# Patient Record
Sex: FEMALE | Race: BLACK OR AFRICAN AMERICAN | NOT HISPANIC OR LATINO | Employment: FULL TIME | URBAN - METROPOLITAN AREA
[De-identification: names, ages, dates, MRNs, and addresses within clinical notes are randomized per-mention and may not be internally consistent; named-entity substitution may affect disease eponyms.]

---

## 2019-06-20 ENCOUNTER — OFFICE VISIT (OUTPATIENT)
Dept: URGENT CARE | Facility: CLINIC | Age: 44
End: 2019-06-20
Payer: COMMERCIAL

## 2019-06-20 VITALS
HEIGHT: 66 IN | WEIGHT: 212 LBS | RESPIRATION RATE: 18 BRPM | DIASTOLIC BLOOD PRESSURE: 81 MMHG | TEMPERATURE: 98 F | OXYGEN SATURATION: 99 % | HEART RATE: 62 BPM | BODY MASS INDEX: 34.07 KG/M2 | SYSTOLIC BLOOD PRESSURE: 123 MMHG

## 2019-06-20 DIAGNOSIS — M25.511 ACUTE PAIN OF RIGHT SHOULDER: Primary | ICD-10-CM

## 2019-06-20 PROCEDURE — 3008F PR BODY MASS INDEX (BMI) DOCUMENTED: ICD-10-PCS | Mod: CPTII,S$GLB,, | Performed by: NURSE PRACTITIONER

## 2019-06-20 PROCEDURE — 96372 PR INJECTION,THERAP/PROPH/DIAG2ST, IM OR SUBCUT: ICD-10-PCS | Mod: S$GLB,,, | Performed by: NURSE PRACTITIONER

## 2019-06-20 PROCEDURE — 99203 OFFICE O/P NEW LOW 30 MIN: CPT | Mod: 25,S$GLB,, | Performed by: NURSE PRACTITIONER

## 2019-06-20 PROCEDURE — 96372 THER/PROPH/DIAG INJ SC/IM: CPT | Mod: S$GLB,,, | Performed by: NURSE PRACTITIONER

## 2019-06-20 PROCEDURE — 99203 PR OFFICE/OUTPT VISIT, NEW, LEVL III, 30-44 MIN: ICD-10-PCS | Mod: 25,S$GLB,, | Performed by: NURSE PRACTITIONER

## 2019-06-20 PROCEDURE — 3008F BODY MASS INDEX DOCD: CPT | Mod: CPTII,S$GLB,, | Performed by: NURSE PRACTITIONER

## 2019-06-20 RX ORDER — KETOROLAC TROMETHAMINE 30 MG/ML
30 INJECTION, SOLUTION INTRAMUSCULAR; INTRAVENOUS
Status: COMPLETED | OUTPATIENT
Start: 2019-06-20 | End: 2019-06-20

## 2019-06-20 RX ORDER — DICLOFENAC SODIUM 10 MG/G
2 GEL TOPICAL 4 TIMES DAILY
Qty: 1 TUBE | Refills: 0 | Status: SHIPPED | OUTPATIENT
Start: 2019-06-20 | End: 2020-09-21 | Stop reason: CLARIF

## 2019-06-20 RX ADMIN — KETOROLAC TROMETHAMINE 30 MG: 30 INJECTION, SOLUTION INTRAMUSCULAR; INTRAVENOUS at 09:06

## 2019-06-20 NOTE — PATIENT INSTRUCTIONS
Patient to take aleve as needed for pain. Patient to alternate heat and ice. Sling given for comfort.     Patient given Orthopedics referral if pain persists or worsens the next 7-10 days.    Educated patient on shoulder exercises that should be performed at home.      Shoulder Pain with Uncertain Cause  Shoulder pain can have many causes. Pain often comes from the structures that surround the shoulder joint. These are the joint capsule, ligaments, tendons, muscles, and bursa. Pain can also come from cartilage in the joint. Cartilage can become worn out or injured. Its important to know whats causing your pain so the healthcare provider can use the correct treatment. But sometimes its difficult to find the exact cause of shoulder pain. You may need to see a specialist (orthopedist). You may also need special tests such as a CT scan or MRI. The provider may need to use special tools to look inside the joint (arthroscopy).  Shoulder pain can be treated with a sling or a device that keeps your shoulder from moving. You can take an anti-inflammatory medicine such as ibuprofen to ease pain. You may need to do special shoulder exercises. Follow up with a specialist if the pain is severe or doesnt go away after a few weeks.  Home care  Follow these tips when caring for yourself at home:  · If a sling was given to you, leave it in place for the time advised by your healthcare provider. If you arent sure how long to wear it, ask for advice. If the sling becomes loose, adjust it so that your forearm is level with the ground. Your shoulder should feel well supported.  · Put an ice pack on the injured area for 20 minutes every 1 to 2 hours the first day. You can make your own ice pack by putting ice cubes in a plastic bag. Wrap the bag in a thin towel. Continue with ice packs 3 to 4 times a day for the next 2 days. Then use the pack as needed to ease pain and swelling.  · You may use acetaminophen or ibuprofen to control  pain, unless another pain medicine was prescribed. If you have chronic liver or kidney disease, talk with your healthcare provider before using these medicines. Also talk with your provider if youve ever had a stomach ulcer or GI bleeding.  · Shoulder pain may seem worse at night, when there is less to distract you from the pain. If you sleep on your side, try to keep weight off your painful shoulder. Propping pillows behind you may stop you from rolling over onto that shoulder during sleep.   · Shoulder and elbow joints can become stiff if left in a sling for too long. You should start range of motion exercises about 7 to 10 days after the injury. Talk with your provider to find out what type of exercises to do and how soon to start.  · You can take the sling off to shower or bathe.  Follow-up care  Follow up with your healthcare provider if you dont start to get better in the next 5 days.  When to seek medical advice  Call your healthcare provider right away if any of these occur:  · Pain or swelling gets worse or continues for more than a few days  · Your hand or fingers become cold, blue, numb, or tingly  · Large amount of bruising on your shoulder or upper arm  · Difficulty moving your hand or fingers  · Weakness in your hand or fingers  · Your shoulder becomes stiff  · It feels like your shoulder is popping out  · You are less able to do your daily activities  Date Last Reviewed: 10/1/2016  © 6504-4670 [employer]. [address] [place] [address]. All rights reserved. This information is not intended as a substitute for professional medical care. Always follow your healthcare professional's instructions.        Exercises for Shoulder Flexibility: Wall Walk    Improving your flexibility can reduce pain. Stretching exercises also can help increase your range of pain-free motion. Breathe normally when you exercise. Use smooth, fluid movements.  Note: Follow any special instructions you  are given. If you feel pain, stop the exercise. If the pain continues after stopping, call your healthcare provider:  · Stand with your shoulder about 2 feet from the wall.  · Raise your arm to shoulder level and gently walk your fingers up the wall as high as you can.  · Hold for a few seconds. Then walk your fingers back down.  · Repeat 3 times. Move closer to the wall as you repeat.  · Build up to holding each stretch for 30 seconds.  Caution: Do this stretch only if your healthcare provider recommends it. Dont do it when you are first injured.       Date Last Reviewed: 8/16/2015  © 1162-8401 [employer]. [address] [place] [address]. All rights reserved. This information is not intended as a substitute for professional medical care. Always follow your healthcare professional's instructions.        Shoulder Clock Exercise    To start, stand tall with your ears, shoulders, and hips in line. Your feet should be slightly apart, positioned just under your hips. Focus your eyes directly in front of you.  this position for a few seconds before starting your exercise. This helps increase your awareness of proper posture.  · Imagine that your right shoulder is the center of a clock. With the outer point of your shoulder, roll it around to slowly trace the outer edge of the clock.  · Move clockwise first, then counterclockwise.  · Repeat 3 to 5 times. Switch shoulders.   Date Last Reviewed: 10/2/2015  © 6653-0309 [employer]. [address] [place] [address]. All rights reserved. This information is not intended as a substitute for professional medical care. Always follow your healthcare professional's instructions.        Exercises for Shoulder Flexibility: Adduction (Reaching Across)    This stretch can help restore shoulder flexibility and relieve pain over time. When stretching, be sure to breathe deeply. And follow any special instructions from your doctor  or physical therapist:  1. Put the hand from the side you want to stretch on your opposite shoulder. Your elbow should point away from your body. Try to raise your elbow as close to shoulder height as you can.  2. With your other hand, push the raised elbow toward the opposite shoulder. Avoid turning your head. Stop when you feel the stretch. Try to hold the stretch for 5 seconds.  3. Work up to doing 3 sets of this stretch, 3 times a day. Work up to holding the stretch for 30 to 60 seconds.  Note: Be sure to push your elbow across your chest, not up toward your chin. Over time, try to push your elbow farther across your chest to enhance the stretch.  Frozen shoulder  Frozen shoulder is another name for adhesive capsulitis, which causes restricted movement in the shoulder. If you have frozen shoulder, this stretch may cause discomfort, especially when you first get started. A few months may pass before you achieve the results you want. Once your shoulder heals, it rarely becomes frozen again. So stick to your stretching program. If you have any questions, be sure to ask your doctor.   Date Last Reviewed: 8/16/2015 © 2000-2017 [employer]. [address] [place] [address], [place] [address]. All rights reserved. This information is not intended as a substitute for professional medical care. Always follow your healthcare professional's instructions.

## 2019-06-20 NOTE — PROGRESS NOTES
"Subjective:       Patient ID: Elisa Gongora is a 43 y.o. female.    Vitals:  height is 5' 6" (1.676 m) and weight is 96.2 kg (212 lb). Her temperature is 98.4 °F (36.9 °C). Her blood pressure is 123/81 and her pulse is 62. Her respiration is 18 and oxygen saturation is 99%.     Chief Complaint: Shoulder Pain (right shoulder started tuesday )    Patient presents with complaint of Right shoulder pain that started Tuesday. She notes she has trouble raising her arm up. She denies any trauma to the shoulder. She took Alieve at 630am this morning and some muscle relax cream.     Shoulder Pain    The pain is present in the right shoulder. This is a new problem. The current episode started in the past 7 days. There has been no history of extremity trauma. The problem occurs intermittently. The problem has been waxing and waning. The quality of the pain is described as sharp. The pain is at a severity of 8/10. The pain is severe. Associated symptoms include stiffness. Pertinent negatives include no fever or headaches. The symptoms are aggravated by activity. She has tried acetaminophen for the symptoms. The treatment provided mild relief. Family history does not include arthritis. There is no history of Injuries to Extremity.       Constitution: Negative for chills, fatigue and fever.   HENT: Negative for congestion and sore throat.    Neck: Negative for painful lymph nodes.   Cardiovascular: Negative for chest pain and leg swelling.   Eyes: Negative for double vision and blurred vision.   Respiratory: Negative for cough and shortness of breath.    Gastrointestinal: Negative for nausea, vomiting and diarrhea.   Genitourinary: Negative for dysuria, frequency, urgency and history of kidney stones.   Musculoskeletal: Positive for pain. Negative for trauma, joint pain, joint swelling, muscle cramps and muscle ache.        Right shoulder pain    Skin: Negative for color change, pale, rash and bruising.   Allergic/Immunologic: " Negative for seasonal allergies.   Neurological: Negative for dizziness, history of vertigo, light-headedness, passing out and headaches.   Hematologic/Lymphatic: Negative for swollen lymph nodes.   Psychiatric/Behavioral: Negative for nervous/anxious, sleep disturbance and depression. The patient is not nervous/anxious.        Objective:      Physical Exam   Musculoskeletal:        Right shoulder: She exhibits decreased range of motion, tenderness and pain. She exhibits no bony tenderness, no swelling and normal strength.        Arms:      Assessment:       1. Acute pain of right shoulder        Plan:         Acute pain of right shoulder  -     SLING FOR HOME USE  -     diclofenac sodium (VOLTAREN) 1 % Gel; Apply 2 g topically 4 (four) times daily.  Dispense: 1 Tube; Refill: 0  -     Ambulatory referral to Orthopedics      Patient Instructions     Patient to take aleve as needed for pain. Patient to alternate heat and ice. Sling given for comfort.     Patient given Orthopedics referral if pain persists or worsens the next 7-10 days.    Educated patient on shoulder exercises that should be performed at home.      Shoulder Pain with Uncertain Cause  Shoulder pain can have many causes. Pain often comes from the structures that surround the shoulder joint. These are the joint capsule, ligaments, tendons, muscles, and bursa. Pain can also come from cartilage in the joint. Cartilage can become worn out or injured. Its important to know whats causing your pain so the healthcare provider can use the correct treatment. But sometimes its difficult to find the exact cause of shoulder pain. You may need to see a specialist (orthopedist). You may also need special tests such as a CT scan or MRI. The provider may need to use special tools to look inside the joint (arthroscopy).  Shoulder pain can be treated with a sling or a device that keeps your shoulder from moving. You can take an anti-inflammatory medicine such as  ibuprofen to ease pain. You may need to do special shoulder exercises. Follow up with a specialist if the pain is severe or doesnt go away after a few weeks.  Home care  Follow these tips when caring for yourself at home:  · If a sling was given to you, leave it in place for the time advised by your healthcare provider. If you arent sure how long to wear it, ask for advice. If the sling becomes loose, adjust it so that your forearm is level with the ground. Your shoulder should feel well supported.  · Put an ice pack on the injured area for 20 minutes every 1 to 2 hours the first day. You can make your own ice pack by putting ice cubes in a plastic bag. Wrap the bag in a thin towel. Continue with ice packs 3 to 4 times a day for the next 2 days. Then use the pack as needed to ease pain and swelling.  · You may use acetaminophen or ibuprofen to control pain, unless another pain medicine was prescribed. If you have chronic liver or kidney disease, talk with your healthcare provider before using these medicines. Also talk with your provider if youve ever had a stomach ulcer or GI bleeding.  · Shoulder pain may seem worse at night, when there is less to distract you from the pain. If you sleep on your side, try to keep weight off your painful shoulder. Propping pillows behind you may stop you from rolling over onto that shoulder during sleep.   · Shoulder and elbow joints can become stiff if left in a sling for too long. You should start range of motion exercises about 7 to 10 days after the injury. Talk with your provider to find out what type of exercises to do and how soon to start.  · You can take the sling off to shower or bathe.  Follow-up care  Follow up with your healthcare provider if you dont start to get better in the next 5 days.  When to seek medical advice  Call your healthcare provider right away if any of these occur:  · Pain or swelling gets worse or continues for more than a few days  · Your hand  or fingers become cold, blue, numb, or tingly  · Large amount of bruising on your shoulder or upper arm  · Difficulty moving your hand or fingers  · Weakness in your hand or fingers  · Your shoulder becomes stiff  · It feels like your shoulder is popping out  · You are less able to do your daily activities  Date Last Reviewed: 10/1/2016  © 2480-1343 [employer]. [address] [place] [address]. All rights reserved. This information is not intended as a substitute for professional medical care. Always follow your healthcare professional's instructions.        Exercises for Shoulder Flexibility: Wall Walk    Improving your flexibility can reduce pain. Stretching exercises also can help increase your range of pain-free motion. Breathe normally when you exercise. Use smooth, fluid movements.  Note: Follow any special instructions you are given. If you feel pain, stop the exercise. If the pain continues after stopping, call your healthcare provider:  · Stand with your shoulder about 2 feet from the wall.  · Raise your arm to shoulder level and gently walk your fingers up the wall as high as you can.  · Hold for a few seconds. Then walk your fingers back down.  · Repeat 3 times. Move closer to the wall as you repeat.  · Build up to holding each stretch for 30 seconds.  Caution: Do this stretch only if your healthcare provider recommends it. Dont do it when you are first injured.       Date Last Reviewed: 8/16/2015  © 4735-8285 [employer]. [address] [place] [address]. All rights reserved. This information is not intended as a substitute for professional medical care. Always follow your healthcare professional's instructions.        Shoulder Clock Exercise    To start, stand tall with your ears, shoulders, and hips in line. Your feet should be slightly apart, positioned just under your hips. Focus your eyes directly in front of you.  this position for a few  seconds before starting your exercise. This helps increase your awareness of proper posture.  · Imagine that your right shoulder is the center of a clock. With the outer point of your shoulder, roll it around to slowly trace the outer edge of the clock.  · Move clockwise first, then counterclockwise.  · Repeat 3 to 5 times. Switch shoulders.   Date Last Reviewed: 10/2/2015  © 7509-2098 [employer]. [address] [place] [address], [place] [address]. All rights reserved. This information is not intended as a substitute for professional medical care. Always follow your healthcare professional's instructions.        Exercises for Shoulder Flexibility: Adduction (Reaching Across)    This stretch can help restore shoulder flexibility and relieve pain over time. When stretching, be sure to breathe deeply. And follow any special instructions from your doctor or physical therapist:  1. Put the hand from the side you want to stretch on your opposite shoulder. Your elbow should point away from your body. Try to raise your elbow as close to shoulder height as you can.  2. With your other hand, push the raised elbow toward the opposite shoulder. Avoid turning your head. Stop when you feel the stretch. Try to hold the stretch for 5 seconds.  3. Work up to doing 3 sets of this stretch, 3 times a day. Work up to holding the stretch for 30 to 60 seconds.  Note: Be sure to push your elbow across your chest, not up toward your chin. Over time, try to push your elbow farther across your chest to enhance the stretch.  Frozen shoulder  Frozen shoulder is another name for adhesive capsulitis, which causes restricted movement in the shoulder. If you have frozen shoulder, this stretch may cause discomfort, especially when you first get started. A few months may pass before you achieve the results you want. Once your shoulder heals, it rarely becomes frozen again. So stick to your stretching program. If you have any questions, be sure  to ask your doctor.   Date Last Reviewed: 8/16/2015  © 6208-2429 [employer]. [address] [place] [address], [place] [address]. All rights reserved. This information is not intended as a substitute for professional medical care. Always follow your healthcare professional's instructions.

## 2020-01-25 ENCOUNTER — NURSE TRIAGE (OUTPATIENT)
Dept: ADMINISTRATIVE | Facility: CLINIC | Age: 45
End: 2020-01-25

## 2020-01-25 ENCOUNTER — HOSPITAL ENCOUNTER (EMERGENCY)
Facility: OTHER | Age: 45
Discharge: HOME | End: 2020-01-25
Attending: EMERGENCY MEDICINE
Payer: COMMERCIAL

## 2020-01-25 VITALS
RESPIRATION RATE: 18 BRPM | SYSTOLIC BLOOD PRESSURE: 141 MMHG | OXYGEN SATURATION: 100 % | DIASTOLIC BLOOD PRESSURE: 90 MMHG | HEART RATE: 65 BPM | BODY MASS INDEX: 34.07 KG/M2 | HEIGHT: 66 IN | TEMPERATURE: 99 F | WEIGHT: 212 LBS

## 2020-01-25 DIAGNOSIS — N76.4 ABSCESS OF LABIA MAJORA: Primary | ICD-10-CM

## 2020-01-25 LAB
B-HCG UR QL: NEGATIVE
CTP QC/QA: YES

## 2020-01-25 PROCEDURE — 25000003 PHARM REV CODE 250: Performed by: EMERGENCY MEDICINE

## 2020-01-25 PROCEDURE — 99284 EMERGENCY DEPT VISIT MOD MDM: CPT | Mod: 25

## 2020-01-25 PROCEDURE — 81025 URINE PREGNANCY TEST: CPT | Performed by: EMERGENCY MEDICINE

## 2020-01-25 PROCEDURE — 56405 I&D VULVA/PERINEAL ABSCESS: CPT

## 2020-01-25 RX ORDER — HYDROCODONE BITARTRATE AND ACETAMINOPHEN 5; 325 MG/1; MG/1
1 TABLET ORAL EVERY 4 HOURS PRN
Qty: 10 TABLET | Refills: 0 | Status: SHIPPED | OUTPATIENT
Start: 2020-01-25 | End: 2020-02-04

## 2020-01-25 RX ORDER — LIDOCAINE HYDROCHLORIDE AND EPINEPHRINE 10; 10 MG/ML; UG/ML
10 INJECTION, SOLUTION INFILTRATION; PERINEURAL
Status: COMPLETED | OUTPATIENT
Start: 2020-01-25 | End: 2020-01-25

## 2020-01-25 RX ORDER — SULFAMETHOXAZOLE AND TRIMETHOPRIM 800; 160 MG/1; MG/1
2 TABLET ORAL
Status: COMPLETED | OUTPATIENT
Start: 2020-01-25 | End: 2020-01-25

## 2020-01-25 RX ORDER — IBUPROFEN 400 MG/1
800 TABLET ORAL
Status: COMPLETED | OUTPATIENT
Start: 2020-01-25 | End: 2020-01-25

## 2020-01-25 RX ORDER — SULFAMETHOXAZOLE AND TRIMETHOPRIM 800; 160 MG/1; MG/1
2 TABLET ORAL 2 TIMES DAILY
Qty: 28 TABLET | Refills: 0 | Status: SHIPPED | OUTPATIENT
Start: 2020-01-25 | End: 2020-02-01

## 2020-01-25 RX ORDER — IBUPROFEN 600 MG/1
600 TABLET ORAL EVERY 6 HOURS PRN
Qty: 20 TABLET | Refills: 0 | Status: SHIPPED | OUTPATIENT
Start: 2020-01-25 | End: 2020-09-21 | Stop reason: CLARIF

## 2020-01-25 RX ADMIN — LIDOCAINE HYDROCHLORIDE,EPINEPHRINE BITARTRATE 10 ML: 10; .01 INJECTION, SOLUTION INFILTRATION; PERINEURAL at 09:01

## 2020-01-25 RX ADMIN — SULFAMETHOXAZOLE AND TRIMETHOPRIM 2 TABLET: 800; 160 TABLET ORAL at 09:01

## 2020-01-25 RX ADMIN — LIDOCAINE-EPINEPHRINE-TETRACAINE GEL 4-0.05-0.5%: 4-0.05-0.5 GEL at 09:01

## 2020-01-25 RX ADMIN — IBUPROFEN 800 MG: 400 TABLET, FILM COATED ORAL at 09:01

## 2020-01-25 NOTE — TELEPHONE ENCOUNTER
Reason for Disposition   SEVERE pain (e.g., excruciating)    Additional Information   Negative: [1] Widespread rash AND [2] bright red, sunburn-like AND [3] too weak to stand   Negative: Sounds like a life-threatening emergency to the triager   Negative: Painful lump or swelling at opening to anus (rectum)   Negative: Painful lump or swelling at opening to vagina (on labia)   Negative: Painful lump or swelling on scrotum   Negative: Impetigo suspected or diagnosed   Negative: Doesn't match the SYMPTOMS of a boil   Negative: MRSA, questions about (No boil or other skin lesion)   Negative: Widespread red rash   Negative: Black (necrotic) color or blisters develop in wound   Negative: Patient sounds very sick or weak to the triager    Protocols used: BOIL (SKIN ABSCESS)-A-AH    Pt stated she has a painful vaginal boil since last Friday. Rates pain as 8/10. Per triage protocol, Pt advised to see Physician within 4 hrs. Provided with information for OB GYN walk in clinic. Pt verbalized understanding.

## 2020-01-25 NOTE — ED PROVIDER NOTES
Encounter Date: 2020    SCRIBE #1 NOTE: I, Pauly Rojas, am scribing for, and in the presence of, Dr. Brooks.       History     Chief Complaint   Patient presents with    Abscess     Pt to ERwith c/o abscess to vaginal area x 1 week. no drainage. Pt denies fevers or chills      Time seen by provider: 8:48 AM    This is a 44 y.o. female who presents with complaint of an abscess in her vaginal area for about a week that has progressively become more swollen, red, and painful. The patient reports pain is 8/10 in severity and she is unable to wipe due to the pain. The patient reports using a topical cream on the abscess with no relief. She also reports recent rash in her groin area. She denies associated fever or chills. She also denies rhinorrhea, cough, CP, SOB, NVD, or vaginal discharge. She denies new sexual partners prior to onset of the abscess and states she is . The patient reports she gets abscesses often in her axillary regions. The patient denies medical hx of HTN DM asthma and does not take daily medications. She states she does have an OB currently but denies chance of pregnancy.    The history is provided by the patient.     Review of patient's allergies indicates:  No Known Allergies  No past medical history on file.  Past Surgical History:   Procedure Laterality Date     SECTION       No family history on file.  Social History     Tobacco Use    Smoking status: Never Smoker   Substance Use Topics    Alcohol use: Yes    Drug use: Never     Review of Systems   Constitutional: Negative for chills and fever.   HENT: Negative for congestion and sore throat.    Eyes: Negative for visual disturbance.   Respiratory: Negative for cough and shortness of breath.    Cardiovascular: Negative for chest pain and palpitations.   Gastrointestinal: Negative for abdominal pain, diarrhea and vomiting.   Genitourinary: Negative for decreased urine volume, dysuria and vaginal discharge.        +  Vaginal abscess.   Musculoskeletal: Negative for joint swelling, neck pain and neck stiffness.   Skin: Positive for rash (Groin.). Negative for wound.   Neurological: Negative for weakness, numbness and headaches.   Psychiatric/Behavioral: Negative for confusion.   All other systems reviewed and are negative.      Physical Exam     Initial Vitals [01/25/20 0835]   BP Pulse Resp Temp SpO2   136/75 74 18 98 °F (36.7 °C) 99 %      MAP       --         Physical Exam    Nursing note and vitals reviewed.  Constitutional: She appears well-developed and well-nourished. She is not diaphoretic. No distress.   HENT:   Head: Normocephalic and atraumatic.   Right Ear: External ear normal.   Left Ear: External ear normal.   Nose: Nose normal.   Mouth/Throat: Oropharynx is clear and moist. No oropharyngeal exudate.   Left eye medial pterygium present.   Eyes: Conjunctivae and EOM are normal. Pupils are equal, round, and reactive to light. No scleral icterus.   Neck: Normal range of motion. Neck supple. No JVD present.   Cardiovascular: Normal rate, regular rhythm, normal heart sounds and intact distal pulses. Exam reveals no gallop and no friction rub.    No murmur heard.  Pulmonary/Chest: Breath sounds normal. No respiratory distress. She has no wheezes. She has no rhonchi. She has no rales. She exhibits no tenderness.   Abdominal: Soft. Bowel sounds are normal. She exhibits no distension and no mass. There is no tenderness. There is no rebound and no guarding.   Genitourinary:   Genitourinary Comments: Left posterior labia majora with 2 cm x 2 cm fluctuant area with central punctum and spontaneous purulent drainage. There is no enlargement of the Bartholin's gland.   Musculoskeletal: Normal range of motion. She exhibits no edema or tenderness.   Neurological: She is alert and oriented to person, place, and time.   Skin: Skin is warm and dry.         ED Course   I & D - Incision and Drainage  Date/Time: 1/25/2020 10:31  AM  Performed by: Sybil Brooks MD  Authorized by: Sybil Brooks MD   Type: abscess  Body area: anogenital (Labia majora.)    Anesthesia:  Local Anesthetic: lidocaine 1% with epinephrine and topical anesthetic  Anesthetic total: 1 mL  Scalpel size: 11  Incision type: single straight  Complexity: simple  Drainage: purulent  Drainage amount: scant  Wound treatment: wound left open  Complications: No  Specimens: No  Implants: No        Labs Reviewed   POCT URINE PREGNANCY          Imaging Results    None          Medical Decision Making:   History:   Old Medical Records: I decided to obtain old medical records.  Clinical Tests:   Lab Tests: Ordered and Reviewed  ED Management:  Emergent evaluation a 44-year-old female with complaint of labial swelling and pain.  Vital signs are benign, afebrile.  On exam there is an abscess to the left labia majora.  This is not a Bartholin's gland abscess.  There is a central punctum present with some spontaneous drainage, this was opened further with an incision and drainage procedure.  Wound is left open to allow for showering and Sitz baths.  Patient is encouraged to use soap and water, pat dry, and is also treated with oral antibiotics.  She is given prescription for Bactrim and analgesics, encouraged close follow-up with PCP and return for worsening.  She is not diabetic, this is not Mauricio's gangrene.            Scribe Attestation:   Scribe #1: I performed the above scribed service and the documentation accurately describes the services I performed. I attest to the accuracy of the note.    Attending Attestation:           Physician Attestation for Scribe:  Physician Attestation Statement for Scribe #1: I, Dr. Brooks, reviewed documentation, as scribed by Pauly Rojas in my presence, and it is both accurate and complete.                               Clinical Impression:     1. Abscess of labia majora                                Sybil Brooks MD  01/25/20  1654       Sybil Brooks MD  02/03/20 0829

## 2020-02-24 ENCOUNTER — HOSPITAL ENCOUNTER (OUTPATIENT)
Facility: OTHER | Age: 45
Discharge: HOME | End: 2020-02-28
Attending: EMERGENCY MEDICINE | Admitting: SPECIALIST
Payer: COMMERCIAL

## 2020-02-24 DIAGNOSIS — K80.00 CALCULUS OF GALLBLADDER WITH ACUTE CHOLECYSTITIS WITHOUT OBSTRUCTION: Primary | ICD-10-CM

## 2020-02-24 LAB
ALBUMIN SERPL BCP-MCNC: 4 G/DL (ref 3.5–5.2)
ALP SERPL-CCNC: 79 U/L (ref 55–135)
ALT SERPL W/O P-5'-P-CCNC: 16 U/L (ref 10–44)
ANION GAP SERPL CALC-SCNC: 8 MMOL/L (ref 8–16)
AST SERPL-CCNC: 18 U/L (ref 10–40)
B-HCG UR QL: NEGATIVE
BASOPHILS # BLD AUTO: 0.05 K/UL (ref 0–0.2)
BASOPHILS NFR BLD: 0.4 % (ref 0–1.9)
BILIRUB SERPL-MCNC: 0.4 MG/DL (ref 0.1–1)
BILIRUB UR QL STRIP: NEGATIVE
BUN SERPL-MCNC: 13 MG/DL (ref 6–20)
CALCIUM SERPL-MCNC: 9.4 MG/DL (ref 8.7–10.5)
CHLORIDE SERPL-SCNC: 108 MMOL/L (ref 95–110)
CLARITY UR: CLEAR
CO2 SERPL-SCNC: 23 MMOL/L (ref 23–29)
COLOR UR: YELLOW
CREAT SERPL-MCNC: 1 MG/DL (ref 0.5–1.4)
CTP QC/QA: YES
DIFFERENTIAL METHOD: ABNORMAL
EOSINOPHIL # BLD AUTO: 0 K/UL (ref 0–0.5)
EOSINOPHIL NFR BLD: 0.3 % (ref 0–8)
ERYTHROCYTE [DISTWIDTH] IN BLOOD BY AUTOMATED COUNT: 18.2 % (ref 11.5–14.5)
EST. GFR  (AFRICAN AMERICAN): >60 ML/MIN/1.73 M^2
EST. GFR  (NON AFRICAN AMERICAN): >60 ML/MIN/1.73 M^2
GLUCOSE SERPL-MCNC: 119 MG/DL (ref 70–110)
GLUCOSE UR QL STRIP: NEGATIVE
HCT VFR BLD AUTO: 34 % (ref 37–48.5)
HGB BLD-MCNC: 10.7 G/DL (ref 12–16)
HGB UR QL STRIP: ABNORMAL
IMM GRANULOCYTES # BLD AUTO: 0.03 K/UL (ref 0–0.04)
IMM GRANULOCYTES NFR BLD AUTO: 0.2 % (ref 0–0.5)
KETONES UR QL STRIP: NEGATIVE
LEUKOCYTE ESTERASE UR QL STRIP: NEGATIVE
LYMPHOCYTES # BLD AUTO: 1.9 K/UL (ref 1–4.8)
LYMPHOCYTES NFR BLD: 13.6 % (ref 18–48)
MCH RBC QN AUTO: 25.2 PG (ref 27–31)
MCHC RBC AUTO-ENTMCNC: 31.5 G/DL (ref 32–36)
MCV RBC AUTO: 80 FL (ref 82–98)
MONOCYTES # BLD AUTO: 0.9 K/UL (ref 0.3–1)
MONOCYTES NFR BLD: 6.1 % (ref 4–15)
NEUTROPHILS # BLD AUTO: 11.2 K/UL (ref 1.8–7.7)
NEUTROPHILS NFR BLD: 79.4 % (ref 38–73)
NITRITE UR QL STRIP: NEGATIVE
NRBC BLD-RTO: 0 /100 WBC
PH UR STRIP: 5 [PH] (ref 5–8)
PLATELET # BLD AUTO: 345 K/UL (ref 150–350)
PMV BLD AUTO: 11.2 FL (ref 9.2–12.9)
POTASSIUM SERPL-SCNC: 4.5 MMOL/L (ref 3.5–5.1)
PROT SERPL-MCNC: 7.7 G/DL (ref 6–8.4)
PROT UR QL STRIP: NEGATIVE
RBC # BLD AUTO: 4.25 M/UL (ref 4–5.4)
SODIUM SERPL-SCNC: 139 MMOL/L (ref 136–145)
SP GR UR STRIP: 1.02 (ref 1–1.03)
URN SPEC COLLECT METH UR: ABNORMAL
UROBILINOGEN UR STRIP-ACNC: NEGATIVE EU/DL
WBC # BLD AUTO: 14.1 K/UL (ref 3.9–12.7)

## 2020-02-24 PROCEDURE — 96361 HYDRATE IV INFUSION ADD-ON: CPT

## 2020-02-24 PROCEDURE — 80053 COMPREHEN METABOLIC PANEL: CPT

## 2020-02-24 PROCEDURE — G0378 HOSPITAL OBSERVATION PER HR: HCPCS

## 2020-02-24 PROCEDURE — 85025 COMPLETE CBC W/AUTO DIFF WBC: CPT

## 2020-02-24 PROCEDURE — A4216 STERILE WATER/SALINE, 10 ML: HCPCS | Performed by: SPECIALIST

## 2020-02-24 PROCEDURE — 81025 URINE PREGNANCY TEST: CPT | Performed by: EMERGENCY MEDICINE

## 2020-02-24 PROCEDURE — 96375 TX/PRO/DX INJ NEW DRUG ADDON: CPT

## 2020-02-24 PROCEDURE — 63600175 PHARM REV CODE 636 W HCPCS: Performed by: EMERGENCY MEDICINE

## 2020-02-24 PROCEDURE — 81003 URINALYSIS AUTO W/O SCOPE: CPT

## 2020-02-24 PROCEDURE — 96376 TX/PRO/DX INJ SAME DRUG ADON: CPT

## 2020-02-24 PROCEDURE — 99285 EMERGENCY DEPT VISIT HI MDM: CPT | Mod: 25

## 2020-02-24 PROCEDURE — 63600175 PHARM REV CODE 636 W HCPCS: Performed by: SPECIALIST

## 2020-02-24 PROCEDURE — 25000003 PHARM REV CODE 250: Performed by: SPECIALIST

## 2020-02-24 PROCEDURE — 25500020 PHARM REV CODE 255: Performed by: EMERGENCY MEDICINE

## 2020-02-24 PROCEDURE — 94761 N-INVAS EAR/PLS OXIMETRY MLT: CPT

## 2020-02-24 PROCEDURE — 96365 THER/PROPH/DIAG IV INF INIT: CPT

## 2020-02-24 RX ORDER — ACETAMINOPHEN 325 MG/1
650 TABLET ORAL EVERY 4 HOURS PRN
Status: DISCONTINUED | OUTPATIENT
Start: 2020-02-24 | End: 2020-02-28 | Stop reason: HOSPADM

## 2020-02-24 RX ORDER — CEFTRIAXONE 250 MG/1
250 INJECTION, POWDER, FOR SOLUTION INTRAMUSCULAR; INTRAVENOUS
Status: DISCONTINUED | OUTPATIENT
Start: 2020-02-24 | End: 2020-02-24

## 2020-02-24 RX ORDER — TALC
9 POWDER (GRAM) TOPICAL NIGHTLY PRN
Status: DISCONTINUED | OUTPATIENT
Start: 2020-02-24 | End: 2020-02-28 | Stop reason: HOSPADM

## 2020-02-24 RX ORDER — ONDANSETRON 8 MG/1
8 TABLET, ORALLY DISINTEGRATING ORAL EVERY 8 HOURS PRN
Status: DISCONTINUED | OUTPATIENT
Start: 2020-02-24 | End: 2020-02-28 | Stop reason: HOSPADM

## 2020-02-24 RX ORDER — SODIUM CHLORIDE, SODIUM LACTATE, POTASSIUM CHLORIDE, CALCIUM CHLORIDE 600; 310; 30; 20 MG/100ML; MG/100ML; MG/100ML; MG/100ML
INJECTION, SOLUTION INTRAVENOUS CONTINUOUS
Status: DISCONTINUED | OUTPATIENT
Start: 2020-02-24 | End: 2020-02-28

## 2020-02-24 RX ORDER — HYDROMORPHONE HYDROCHLORIDE 1 MG/ML
0.5 INJECTION, SOLUTION INTRAMUSCULAR; INTRAVENOUS; SUBCUTANEOUS
Status: COMPLETED | OUTPATIENT
Start: 2020-02-24 | End: 2020-02-24

## 2020-02-24 RX ORDER — KETOROLAC TROMETHAMINE 30 MG/ML
15 INJECTION, SOLUTION INTRAMUSCULAR; INTRAVENOUS
Status: COMPLETED | OUTPATIENT
Start: 2020-02-24 | End: 2020-02-24

## 2020-02-24 RX ORDER — LIDOCAINE HYDROCHLORIDE 10 MG/ML
1 INJECTION, SOLUTION EPIDURAL; INFILTRATION; INTRACAUDAL; PERINEURAL ONCE
Status: DISCONTINUED | OUTPATIENT
Start: 2020-02-24 | End: 2020-02-28 | Stop reason: HOSPADM

## 2020-02-24 RX ORDER — ACETAMINOPHEN 325 MG/1
325 TABLET ORAL EVERY 6 HOURS PRN
COMMUNITY

## 2020-02-24 RX ORDER — ACETAMINOPHEN 325 MG/1
650 TABLET ORAL EVERY 8 HOURS PRN
Status: DISCONTINUED | OUTPATIENT
Start: 2020-02-24 | End: 2020-02-28 | Stop reason: HOSPADM

## 2020-02-24 RX ORDER — HYDROMORPHONE HYDROCHLORIDE 1 MG/ML
0.5 INJECTION, SOLUTION INTRAMUSCULAR; INTRAVENOUS; SUBCUTANEOUS
Status: DISCONTINUED | OUTPATIENT
Start: 2020-02-24 | End: 2020-02-28 | Stop reason: HOSPADM

## 2020-02-24 RX ORDER — OXYCODONE HYDROCHLORIDE 5 MG/1
5 TABLET ORAL EVERY 4 HOURS PRN
Status: DISCONTINUED | OUTPATIENT
Start: 2020-02-24 | End: 2020-02-28 | Stop reason: HOSPADM

## 2020-02-24 RX ORDER — SODIUM CHLORIDE 0.9 % (FLUSH) 0.9 %
3 SYRINGE (ML) INJECTION EVERY 8 HOURS
Status: DISCONTINUED | OUTPATIENT
Start: 2020-02-24 | End: 2020-02-28 | Stop reason: HOSPADM

## 2020-02-24 RX ADMIN — PIPERACILLIN SODIUM AND TAZOBACTAM SODIUM 4.5 G: 4; .5 INJECTION, POWDER, LYOPHILIZED, FOR SOLUTION INTRAVENOUS at 08:02

## 2020-02-24 RX ADMIN — IOHEXOL 100 ML: 350 INJECTION, SOLUTION INTRAVENOUS at 05:02

## 2020-02-24 RX ADMIN — SODIUM CHLORIDE, SODIUM LACTATE, POTASSIUM CHLORIDE, AND CALCIUM CHLORIDE: 600; 310; 30; 20 INJECTION, SOLUTION INTRAVENOUS at 09:02

## 2020-02-24 RX ADMIN — HYDROMORPHONE HYDROCHLORIDE 0.5 MG: 1 INJECTION, SOLUTION INTRAMUSCULAR; INTRAVENOUS; SUBCUTANEOUS at 10:02

## 2020-02-24 RX ADMIN — Medication 3 ML: at 09:02

## 2020-02-24 RX ADMIN — SODIUM CHLORIDE 1000 ML: 0.9 INJECTION, SOLUTION INTRAVENOUS at 03:02

## 2020-02-24 RX ADMIN — KETOROLAC TROMETHAMINE 15 MG: 30 INJECTION, SOLUTION INTRAMUSCULAR; INTRAVENOUS at 03:02

## 2020-02-24 RX ADMIN — HYDROMORPHONE HYDROCHLORIDE 0.5 MG: 1 INJECTION, SOLUTION INTRAMUSCULAR; INTRAVENOUS; SUBCUTANEOUS at 06:02

## 2020-02-24 NOTE — ED NOTES
LOC: The patient is awake, alert and aware of environment with an appropriate affect, the patient is oriented x 3 and speaking appropriately.  APPEARANCE: Patient resting comfortably and in no acute distress, patient is clean and well groomed, patient's clothing is properly fastened.  SKIN: The skin is warm and dry, patient has normal skin turgor and moist mucus membranes, skin intact, no breakdown or brusing noted.  MUSKULOSKELETAL: Patient moving all extremities well, no obvious swelling or deformities noted.  RESPIRATORY: Airway is open and patent, respirations are spontaneous, patient has a normal effort and rate. Breath sounds are clear and equal bilaterally.  CARDIAC: Normal heart sounds. No peripheral edema.  ABDOMEN: Soft and tender to palpation, no distention noted. Pt reporting increasing sharp abdominal over the past couple days which has travelled from the RUQ to the RLQ.    Pt also reporting nausea and vomiting with chills and fever.

## 2020-02-24 NOTE — ED NOTES
Pt rounding complete.  Pain 6/10, states she is feeling better, not requesting medication at this time.  Restroom and comfort needs addressed.  Pt updated on plan of care.  Call light within reach.  Will continue to monitor.

## 2020-02-24 NOTE — ED PROVIDER NOTES
Encounter Date: 2020    SCRIBE #1 NOTE: IVinod, argent[place] scribing for, and in the presence of, Dr. Herring.       History     Chief Complaint   Patient presents with    Abdominal Pain     R-sided abd pain since 2 am, +n/+v x 2 days, reports pink streaks in vomit, reports fever since yesterday, T-max 101.8     Time seen by provider: 3:06 PM    This is a 44 y.o. female who presents with complaint of right abdominal pain that began this morning. Two days ago she is also experiencing nausea, vomiting, and diarrhea. This morning she woke up with a fever and right-sided abdominal pain. She has had seven total episodes of vomiting and diarrhea every few hours. She denies sore throat, chest pain, shortness of breath, and dysuria. The patient has had no recent sick contacts.    Patient a temperature at home to 101.8.  She last took Tylenol at 12:30 p.m..    The history is provided by the patient.     Review of patient's allergies indicates:  No Known Allergies  No past medical history on file.  Past Surgical History:   Procedure Laterality Date     SECTION       No family history on file.  Social History     Tobacco Use    Smoking status: Never Smoker   Substance Use Topics    Alcohol use: Yes    Drug use: Never     Review of Systems   Constitutional: Positive for chills and fever.   HENT: Positive for sore throat. Negative for congestion.    Respiratory: Negative for cough and shortness of breath.    Cardiovascular: Negative for chest pain.   Gastrointestinal: Positive for abdominal pain, diarrhea, nausea and vomiting.   Genitourinary: Negative for dysuria.   Musculoskeletal: Negative for back pain.   Skin: Negative for rash.   Neurological: Negative for weakness.   Hematological: Does not bruise/bleed easily.       Physical Exam     Initial Vitals [20 1354]   BP Pulse Resp Temp SpO2   (Abnormal) 150/89 73 16 98.6 °F (37 °C) 99 %      MAP       --         Physical Exam    Nursing note and vitals  reviewed.  Constitutional: She appears well-developed and well-nourished. She is not diaphoretic. No distress.   HENT:   Head: Normocephalic and atraumatic.   Mouth/Throat: Oropharynx is clear and moist.   Eyes: EOM are normal. Pupils are equal, round, and reactive to light. Right eye exhibits no discharge. Left eye exhibits no discharge.   Neck: Normal range of motion.   Cardiovascular: Normal rate, regular rhythm and normal heart sounds. Exam reveals no gallop and no friction rub.    No murmur heard.  Pulmonary/Chest: Breath sounds normal. No respiratory distress. She has no wheezes. She has no rhonchi. She has no rales.   Abdominal: Soft. Bowel sounds are normal. There is tenderness in the right upper quadrant and epigastric area. There is CVA tenderness (right). There is no rebound and no guarding.   Right epigastric. Tenderness to percussion.    Musculoskeletal: Normal range of motion. She exhibits no edema or tenderness.   Neurological: She is alert and oriented to person, place, and time.   Skin: Skin is warm and dry. No rash and no abscess noted. No erythema. No pallor.   Psychiatric: She has a normal mood and affect. Her behavior is normal. Judgment and thought content normal.         ED Course   Procedures  Labs Reviewed   URINALYSIS, REFLEX TO URINE CULTURE   POCT URINE PREGNANCY          Imaging Results    None          Medical Decision Making:   History:   Old Medical Records: I decided to obtain old medical records.  Clinical Tests:   Lab Tests: Ordered and Reviewed  Radiological Study: Ordered and Reviewed            Scribe Attestation:   Scribe #1: I performed the above scribed service and the documentation accurately describes the services I performed. I attest to the accuracy of the note.    Attending Attestation:           Physician Attestation for Scribe:  Physician Attestation Statement for Scribe #1: I, Dr. Herring, reviewed documentation, as scribed by Vinod Benson in my presence, and it is  both accurate and complete.                 ED Course as of Feb 24 2021   Mon Feb 24, 2020   1742 Discussed the results with the patient, who states that that her pain has improved but is not gone.    [MM]   2010 Discussed the case with Dr. Dunne, who will evaluated the patient.     [MM]      ED Course User Index  [MM] Vinod Benson        8:01 PM pain improved from 10/10 on arrival, to 6/10 on last check.  Currently now 4/10.  But not back to baseline.   to palpation on abdominal exam and right upper quadrant and right epigastric.  And somewhat right lower quadrant.  Patient reports some nausea.  Results of ultrasound explained    8:21 PM ABOUT 15 MIN AGO DR. DUNNE WAS IN THE ER AT BEDSIDE EXAMINED PATIENT AND INFORMED TO ME HE WILL BE WRITING ADMIT ORDERS TO ADMIT THIS PATIENT    Clinical Impression:     1. Calculus of gallbladder with acute cholecystitis without obstruction                              Rachel Herring MD  02/24/20 2022

## 2020-02-24 NOTE — ED NOTES
Pt rounding complete.  Pain 5/10, not requesting medication at this time.  Restroom and comfort needs addressed.  Pt updated on plan of care.  Call light within reach.  Will continue to monitor.

## 2020-02-25 LAB
ALBUMIN SERPL BCP-MCNC: 3.3 G/DL (ref 3.5–5.2)
ALP SERPL-CCNC: 69 U/L (ref 55–135)
ALT SERPL W/O P-5'-P-CCNC: 63 U/L (ref 10–44)
ANION GAP SERPL CALC-SCNC: 9 MMOL/L (ref 8–16)
AST SERPL-CCNC: 107 U/L (ref 10–40)
BASOPHILS # BLD AUTO: 0.05 K/UL (ref 0–0.2)
BASOPHILS NFR BLD: 0.5 % (ref 0–1.9)
BILIRUB SERPL-MCNC: 0.6 MG/DL (ref 0.1–1)
BUN SERPL-MCNC: 11 MG/DL (ref 6–20)
CALCIUM SERPL-MCNC: 8.4 MG/DL (ref 8.7–10.5)
CHLORIDE SERPL-SCNC: 107 MMOL/L (ref 95–110)
CO2 SERPL-SCNC: 22 MMOL/L (ref 23–29)
CREAT SERPL-MCNC: 0.9 MG/DL (ref 0.5–1.4)
DIFFERENTIAL METHOD: ABNORMAL
EOSINOPHIL # BLD AUTO: 0.1 K/UL (ref 0–0.5)
EOSINOPHIL NFR BLD: 0.8 % (ref 0–8)
ERYTHROCYTE [DISTWIDTH] IN BLOOD BY AUTOMATED COUNT: 18 % (ref 11.5–14.5)
EST. GFR  (AFRICAN AMERICAN): >60 ML/MIN/1.73 M^2
EST. GFR  (NON AFRICAN AMERICAN): >60 ML/MIN/1.73 M^2
GLUCOSE SERPL-MCNC: 117 MG/DL (ref 70–110)
HCT VFR BLD AUTO: 30.2 % (ref 37–48.5)
HGB BLD-MCNC: 9.4 G/DL (ref 12–16)
IMM GRANULOCYTES # BLD AUTO: 0.02 K/UL (ref 0–0.04)
IMM GRANULOCYTES NFR BLD AUTO: 0.2 % (ref 0–0.5)
LYMPHOCYTES # BLD AUTO: 1.7 K/UL (ref 1–4.8)
LYMPHOCYTES NFR BLD: 15.9 % (ref 18–48)
MCH RBC QN AUTO: 25.1 PG (ref 27–31)
MCHC RBC AUTO-ENTMCNC: 31.1 G/DL (ref 32–36)
MCV RBC AUTO: 81 FL (ref 82–98)
MONOCYTES # BLD AUTO: 0.9 K/UL (ref 0.3–1)
MONOCYTES NFR BLD: 9 % (ref 4–15)
NEUTROPHILS # BLD AUTO: 7.7 K/UL (ref 1.8–7.7)
NEUTROPHILS NFR BLD: 73.6 % (ref 38–73)
NRBC BLD-RTO: 0 /100 WBC
PLATELET # BLD AUTO: 278 K/UL (ref 150–350)
PMV BLD AUTO: 11.3 FL (ref 9.2–12.9)
POTASSIUM SERPL-SCNC: 3.4 MMOL/L (ref 3.5–5.1)
PROT SERPL-MCNC: 6.4 G/DL (ref 6–8.4)
RBC # BLD AUTO: 3.75 M/UL (ref 4–5.4)
SODIUM SERPL-SCNC: 138 MMOL/L (ref 136–145)
WBC # BLD AUTO: 10.43 K/UL (ref 3.9–12.7)

## 2020-02-25 PROCEDURE — 94761 N-INVAS EAR/PLS OXIMETRY MLT: CPT

## 2020-02-25 PROCEDURE — 96376 TX/PRO/DX INJ SAME DRUG ADON: CPT

## 2020-02-25 PROCEDURE — 96361 HYDRATE IV INFUSION ADD-ON: CPT

## 2020-02-25 PROCEDURE — 85025 COMPLETE CBC W/AUTO DIFF WBC: CPT

## 2020-02-25 PROCEDURE — 80053 COMPREHEN METABOLIC PANEL: CPT

## 2020-02-25 PROCEDURE — 63600175 PHARM REV CODE 636 W HCPCS: Performed by: SPECIALIST

## 2020-02-25 PROCEDURE — 36415 COLL VENOUS BLD VENIPUNCTURE: CPT

## 2020-02-25 PROCEDURE — G0378 HOSPITAL OBSERVATION PER HR: HCPCS

## 2020-02-25 PROCEDURE — 25000003 PHARM REV CODE 250: Performed by: SPECIALIST

## 2020-02-25 RX ADMIN — PIPERACILLIN SODIUM AND TAZOBACTAM SODIUM 4.5 G: 4; .5 INJECTION, POWDER, LYOPHILIZED, FOR SOLUTION INTRAVENOUS at 03:02

## 2020-02-25 RX ADMIN — PIPERACILLIN SODIUM AND TAZOBACTAM SODIUM 4.5 G: 4; .5 INJECTION, POWDER, LYOPHILIZED, FOR SOLUTION INTRAVENOUS at 07:02

## 2020-02-25 RX ADMIN — HYDROMORPHONE HYDROCHLORIDE 0.5 MG: 1 INJECTION, SOLUTION INTRAMUSCULAR; INTRAVENOUS; SUBCUTANEOUS at 07:02

## 2020-02-25 RX ADMIN — OXYCODONE HYDROCHLORIDE 5 MG: 5 TABLET ORAL at 11:02

## 2020-02-25 RX ADMIN — PIPERACILLIN SODIUM AND TAZOBACTAM SODIUM 4.5 G: 4; .5 INJECTION, POWDER, LYOPHILIZED, FOR SOLUTION INTRAVENOUS at 12:02

## 2020-02-25 RX ADMIN — SODIUM CHLORIDE, SODIUM LACTATE, POTASSIUM CHLORIDE, AND CALCIUM CHLORIDE: 600; 310; 30; 20 INJECTION, SOLUTION INTRAVENOUS at 11:02

## 2020-02-25 RX ADMIN — OXYCODONE HYDROCHLORIDE 5 MG: 5 TABLET ORAL at 12:02

## 2020-02-25 RX ADMIN — HYDROMORPHONE HYDROCHLORIDE 0.5 MG: 1 INJECTION, SOLUTION INTRAMUSCULAR; INTRAVENOUS; SUBCUTANEOUS at 03:02

## 2020-02-25 NOTE — OP NOTE
History & Physical  General Surgery      SUBJECTIVE:     Chief Complaint/Reason for Admission:  Gallbladder disease    History of Present Illness:  Patient is a 44 y.o. female presents with a 2 day history of nausea, vomiting, and diarrhea.  Patient developed right upper quadrant pain this morning which was severe in nature.  She had a fever of 101.8 at home.  She denies weight loss, jaundice.      (Not in a hospital admission)    Review of patient's allergies indicates:  No Known Allergies    No past medical history on file.  Past Surgical History:   Procedure Laterality Date     SECTION       No family history on file.  Social History     Tobacco Use    Smoking status: Never Smoker   Substance Use Topics    Alcohol use: Yes    Drug use: Never        Review of Systems   Constitutional: Positive for activity change, appetite change, chills and fever. Negative for unexpected weight change.   HENT: Negative.    Respiratory: Negative.    Cardiovascular: Negative for chest pain, palpitations and leg swelling.   Gastrointestinal: Positive for abdominal distention, abdominal pain, diarrhea, nausea and vomiting.   Genitourinary: Negative for difficulty urinating, dysuria, flank pain, frequency, hematuria and urgency.   Musculoskeletal: Negative.    Skin: Negative for pallor and rash.   Neurological: Negative for dizziness, tremors, facial asymmetry, speech difficulty, weakness, light-headedness and numbness.   Psychiatric/Behavioral: Negative for agitation, behavioral problems and confusion. The patient is not nervous/anxious.      OBJECTIVE:     Vital Signs (Most Recent)  Temp: 98.3 °F (36.8 °C) (20 1842)  Pulse: 74 (20)  Resp: 16 (20 1354)  BP: (!) 142/86 (20)  SpO2: 99 % (20)    Physical Exam   Constitutional: She is oriented to person, place, and time. She appears well-developed and well-nourished. No distress.   HENT:   Head: Normocephalic and atraumatic.    Eyes: EOM are normal. No scleral icterus.   Neck: Neck supple. No JVD present. No tracheal deviation present.   Cardiovascular: Normal rate, regular rhythm and intact distal pulses. Exam reveals friction rub. Exam reveals no gallop.   Murmur heard.  Pulmonary/Chest: Effort normal and breath sounds normal. No respiratory distress. She has no wheezes. She has no rales.   Abdominal: Soft. She exhibits no mass. There is tenderness. There is no rebound. No hernia.   Musculoskeletal: Normal range of motion. She exhibits no tenderness or deformity.   Neurological: She is alert and oriented to person, place, and time. She exhibits normal muscle tone. Coordination normal.   Skin: Skin is warm and dry. No rash noted.   Psychiatric: She has a normal mood and affect. Her behavior is normal. Judgment and thought content normal.     Laboratory  CBC:   Recent Labs   Lab 02/24/20  1543   WBC 14.10*   RBC 4.25   HGB 10.7*   HCT 34.0*      MCV 80*   MCH 25.2*   MCHC 31.5*     CMP:   Recent Labs   Lab 02/24/20  1543   *   CALCIUM 9.4   ALBUMIN 4.0   PROT 7.7      K 4.5   CO2 23      BUN 13   CREATININE 1.0   ALKPHOS 79   ALT 16   AST 18   BILITOT 0.4     Recent Labs   Lab 02/24/20  1518   COLORU Yellow   SPECGRAV 1.020   PHUR 5.0   PROTEINUA Negative   NITRITE Negative   LEUKOCYTESUR Negative   UROBILINOGEN Negative       Diagnostic Results:  US: Reviewed  CT: Reviewed  CT scan of the abdomen ultrasound show cholelithiasis with pericholecystic edema.

## 2020-02-25 NOTE — PROGRESS NOTES
Pt transferred from ED to unit in stable condition. ID verified and assessment completed per flowsheet. Pt states pain is managed with medication given in ED. IV fluids initiated. Plan of care reviewed with patient. Will continue to monitor.

## 2020-02-25 NOTE — PLAN OF CARE
Discharge planner met with patient at the bedside, Patient denies any inpatient admissions less than 30 days.    Preferred pharmacy: [facility]   Denies dialysis care  Denies Coumadin for home use  Denies active home health services  Denies DME for home use.  Dc plan is home with no needs. Family or friend to provide transportation home at the time of discharge.   2/25/20 1159   Discharge Assessment   Assessment Type Discharge Planning Assessment   Confirmed/corrected address and phone number on face sheet? Yes; Corrected address [address] [place] [address]   Assessment information obtained from? Patient    Communicated expected length of stay with patient/caregiver Yes, observation    Type of Healthcare Directive Received None    If Healthcare Directive is received, is it scanned into Epic? no (comment)   Prior to hospitalization cognitive status: Alert/Oriented   Prior to hospitalization functional status: Independent    Current cognitive status: Alert/Oriented    Current Functional Status: Independent    Arrived From home or self-care   Lives With Spouse    Able to Return to Prior Arrangements Yes    Is patient able to care for self after discharge? Yes    Provided patient/caregiver education on the expected discharge date and the discharge plan Yes   Do you have any financial concerns preventing you from receiving the healthcare you need? No   Does the patient have transportation to healthcare appointments? Yes   Transportation Available family or friend will provide   Patient's perception of discharge disposition home or self care   Discharge Plan A Home with self care    Discharge Plan B Home with family    Patient/Family In Agreement With Plan Yes

## 2020-02-25 NOTE — PROGRESS NOTES
Afebrile  Vital signs stable  Feeling better    PE  HEENT-anicteric  Neck-supple  Chest-clear  Heart-regular rate and rhythm  Abdomen-soft, direct tenderness to deep palpation, no rebound    Lab  White blood cell count within normal limits    Impression/plan  Symptomatic gallbladder disease  For cholecystectomy

## 2020-02-26 ENCOUNTER — ANESTHESIA EVENT (OUTPATIENT)
Dept: SURGERY | Facility: OTHER | Age: 45
End: 2020-02-26
Payer: COMMERCIAL

## 2020-02-26 ENCOUNTER — ANESTHESIA (OUTPATIENT)
Dept: SURGERY | Facility: OTHER | Age: 45
End: 2020-02-26
Payer: COMMERCIAL

## 2020-02-26 LAB
ABO + RH BLD: NORMAL
ALBUMIN SERPL BCP-MCNC: 3.1 G/DL (ref 3.5–5.2)
ALP SERPL-CCNC: 77 U/L (ref 55–135)
ALT SERPL W/O P-5'-P-CCNC: 85 U/L (ref 10–44)
ANION GAP SERPL CALC-SCNC: 8 MMOL/L (ref 8–16)
AST SERPL-CCNC: 50 U/L (ref 10–40)
BASOPHILS # BLD AUTO: 0.06 K/UL (ref 0–0.2)
BASOPHILS NFR BLD: 0.5 % (ref 0–1.9)
BILIRUB SERPL-MCNC: 0.5 MG/DL (ref 0.1–1)
BLD GP AB SCN CELLS X3 SERPL QL: NORMAL
BUN SERPL-MCNC: 7 MG/DL (ref 6–20)
CALCIUM SERPL-MCNC: 8.6 MG/DL (ref 8.7–10.5)
CHLORIDE SERPL-SCNC: 107 MMOL/L (ref 95–110)
CO2 SERPL-SCNC: 24 MMOL/L (ref 23–29)
CREAT SERPL-MCNC: 0.8 MG/DL (ref 0.5–1.4)
DIFFERENTIAL METHOD: ABNORMAL
EOSINOPHIL # BLD AUTO: 0.2 K/UL (ref 0–0.5)
EOSINOPHIL NFR BLD: 2.1 % (ref 0–8)
ERYTHROCYTE [DISTWIDTH] IN BLOOD BY AUTOMATED COUNT: 18.1 % (ref 11.5–14.5)
EST. GFR  (AFRICAN AMERICAN): >60 ML/MIN/1.73 M^2
EST. GFR  (NON AFRICAN AMERICAN): >60 ML/MIN/1.73 M^2
GLUCOSE SERPL-MCNC: 101 MG/DL (ref 70–110)
HCT VFR BLD AUTO: 30.1 % (ref 37–48.5)
HGB BLD-MCNC: 9.3 G/DL (ref 12–16)
IMM GRANULOCYTES # BLD AUTO: 0.03 K/UL (ref 0–0.04)
IMM GRANULOCYTES NFR BLD AUTO: 0.3 % (ref 0–0.5)
LYMPHOCYTES # BLD AUTO: 2 K/UL (ref 1–4.8)
LYMPHOCYTES NFR BLD: 17.2 % (ref 18–48)
MCH RBC QN AUTO: 24.7 PG (ref 27–31)
MCHC RBC AUTO-ENTMCNC: 30.9 G/DL (ref 32–36)
MCV RBC AUTO: 80 FL (ref 82–98)
MONOCYTES # BLD AUTO: 1.3 K/UL (ref 0.3–1)
MONOCYTES NFR BLD: 11.1 % (ref 4–15)
NEUTROPHILS # BLD AUTO: 7.9 K/UL (ref 1.8–7.7)
NEUTROPHILS NFR BLD: 68.8 % (ref 38–73)
NRBC BLD-RTO: 0 /100 WBC
PLATELET # BLD AUTO: 290 K/UL (ref 150–350)
PMV BLD AUTO: 11.3 FL (ref 9.2–12.9)
POTASSIUM SERPL-SCNC: 3.3 MMOL/L (ref 3.5–5.1)
PROT SERPL-MCNC: 6.3 G/DL (ref 6–8.4)
RBC # BLD AUTO: 3.77 M/UL (ref 4–5.4)
SODIUM SERPL-SCNC: 139 MMOL/L (ref 136–145)
WBC # BLD AUTO: 11.51 K/UL (ref 3.9–12.7)

## 2020-02-26 PROCEDURE — 63600175 PHARM REV CODE 636 W HCPCS: Performed by: SPECIALIST

## 2020-02-26 PROCEDURE — 25000003 PHARM REV CODE 250: Performed by: NURSE ANESTHETIST, CERTIFIED REGISTERED

## 2020-02-26 PROCEDURE — 25000003 PHARM REV CODE 250: Performed by: SPECIALIST

## 2020-02-26 PROCEDURE — 88304 TISSUE EXAM BY PATHOLOGIST: CPT | Performed by: PATHOLOGY

## 2020-02-26 PROCEDURE — 71000039 HC RECOVERY, EACH ADD'L HOUR: Performed by: SPECIALIST

## 2020-02-26 PROCEDURE — 71000033 HC RECOVERY, INTIAL HOUR: Performed by: SPECIALIST

## 2020-02-26 PROCEDURE — 36000709 HC OR TIME LEV III EA ADD 15 MIN: Performed by: SPECIALIST

## 2020-02-26 PROCEDURE — 85025 COMPLETE CBC W/AUTO DIFF WBC: CPT

## 2020-02-26 PROCEDURE — 25000003 PHARM REV CODE 250: Performed by: ANESTHESIOLOGY

## 2020-02-26 PROCEDURE — 63600175 PHARM REV CODE 636 W HCPCS: Performed by: NURSE ANESTHETIST, CERTIFIED REGISTERED

## 2020-02-26 PROCEDURE — 94761 N-INVAS EAR/PLS OXIMETRY MLT: CPT

## 2020-02-26 PROCEDURE — 80053 COMPREHEN METABOLIC PANEL: CPT

## 2020-02-26 PROCEDURE — 00790 ANES IPER UPR ABD NOS: CPT | Performed by: SPECIALIST

## 2020-02-26 PROCEDURE — 96376 TX/PRO/DX INJ SAME DRUG ADON: CPT | Mod: 59

## 2020-02-26 PROCEDURE — G0378 HOSPITAL OBSERVATION PER HR: HCPCS

## 2020-02-26 PROCEDURE — 63600175 PHARM REV CODE 636 W HCPCS: Performed by: ANESTHESIOLOGY

## 2020-02-26 PROCEDURE — 88304 TISSUE EXAM BY PATHOLOGIST: CPT | Mod: 26,,, | Performed by: PATHOLOGY

## 2020-02-26 PROCEDURE — 88304 PR  SURG PATH,LEVEL III: ICD-10-PCS | Mod: 26,,, | Performed by: PATHOLOGY

## 2020-02-26 PROCEDURE — A4216 STERILE WATER/SALINE, 10 ML: HCPCS | Performed by: SPECIALIST

## 2020-02-26 PROCEDURE — C1729 CATH, DRAINAGE: HCPCS | Performed by: SPECIALIST

## 2020-02-26 PROCEDURE — 27201423 OPTIME MED/SURG SUP & DEVICES STERILE SUPPLY: Performed by: SPECIALIST

## 2020-02-26 PROCEDURE — 36415 COLL VENOUS BLD VENIPUNCTURE: CPT

## 2020-02-26 PROCEDURE — 37000008 HC ANESTHESIA 1ST 15 MINUTES: Performed by: SPECIALIST

## 2020-02-26 PROCEDURE — 86850 RBC ANTIBODY SCREEN: CPT

## 2020-02-26 PROCEDURE — 36000708 HC OR TIME LEV III 1ST 15 MIN: Performed by: SPECIALIST

## 2020-02-26 PROCEDURE — 96361 HYDRATE IV INFUSION ADD-ON: CPT

## 2020-02-26 PROCEDURE — 37000009 HC ANESTHESIA EA ADD 15 MINS: Performed by: SPECIALIST

## 2020-02-26 RX ORDER — FENTANYL CITRATE 50 UG/ML
INJECTION, SOLUTION INTRAMUSCULAR; INTRAVENOUS
Status: DISCONTINUED | OUTPATIENT
Start: 2020-02-26 | End: 2020-02-26

## 2020-02-26 RX ORDER — ROCURONIUM BROMIDE 10 MG/ML
INJECTION, SOLUTION INTRAVENOUS
Status: DISCONTINUED | OUTPATIENT
Start: 2020-02-26 | End: 2020-02-26

## 2020-02-26 RX ORDER — HYDROMORPHONE HYDROCHLORIDE 2 MG/ML
0.4 INJECTION, SOLUTION INTRAMUSCULAR; INTRAVENOUS; SUBCUTANEOUS EVERY 5 MIN PRN
Status: DISCONTINUED | OUTPATIENT
Start: 2020-02-26 | End: 2020-02-26

## 2020-02-26 RX ORDER — NEOSTIGMINE METHYLSULFATE 1 MG/ML
INJECTION, SOLUTION INTRAVENOUS
Status: DISCONTINUED | OUTPATIENT
Start: 2020-02-26 | End: 2020-02-26

## 2020-02-26 RX ORDER — PROPOFOL 10 MG/ML
VIAL (ML) INTRAVENOUS
Status: DISCONTINUED | OUTPATIENT
Start: 2020-02-26 | End: 2020-02-26

## 2020-02-26 RX ORDER — OXYCODONE AND ACETAMINOPHEN 7.5; 325 MG/1; MG/1
1 TABLET ORAL EVERY 6 HOURS PRN
Status: DISCONTINUED | OUTPATIENT
Start: 2020-02-26 | End: 2020-02-28 | Stop reason: HOSPADM

## 2020-02-26 RX ORDER — OXYCODONE HYDROCHLORIDE 5 MG/1
5 TABLET ORAL
Status: DISCONTINUED | OUTPATIENT
Start: 2020-02-26 | End: 2020-02-26

## 2020-02-26 RX ORDER — GLYCOPYRROLATE 0.2 MG/ML
INJECTION INTRAMUSCULAR; INTRAVENOUS
Status: DISCONTINUED | OUTPATIENT
Start: 2020-02-26 | End: 2020-02-26

## 2020-02-26 RX ORDER — SODIUM CHLORIDE 0.9 % (FLUSH) 0.9 %
3 SYRINGE (ML) INJECTION
Status: DISCONTINUED | OUTPATIENT
Start: 2020-02-26 | End: 2020-02-28 | Stop reason: HOSPADM

## 2020-02-26 RX ORDER — MIDAZOLAM HYDROCHLORIDE 1 MG/ML
INJECTION INTRAMUSCULAR; INTRAVENOUS
Status: DISCONTINUED | OUTPATIENT
Start: 2020-02-26 | End: 2020-02-26

## 2020-02-26 RX ORDER — LIDOCAINE HYDROCHLORIDE 20 MG/ML
INJECTION INTRAVENOUS
Status: DISCONTINUED | OUTPATIENT
Start: 2020-02-26 | End: 2020-02-26

## 2020-02-26 RX ORDER — MEPERIDINE HYDROCHLORIDE 25 MG/ML
12.5 INJECTION INTRAMUSCULAR; INTRAVENOUS; SUBCUTANEOUS ONCE AS NEEDED
Status: DISCONTINUED | OUTPATIENT
Start: 2020-02-26 | End: 2020-02-26

## 2020-02-26 RX ORDER — ONDANSETRON 2 MG/ML
INJECTION INTRAMUSCULAR; INTRAVENOUS
Status: DISCONTINUED | OUTPATIENT
Start: 2020-02-26 | End: 2020-02-26

## 2020-02-26 RX ORDER — ONDANSETRON 2 MG/ML
4 INJECTION INTRAMUSCULAR; INTRAVENOUS DAILY PRN
Status: DISCONTINUED | OUTPATIENT
Start: 2020-02-26 | End: 2020-02-26

## 2020-02-26 RX ADMIN — Medication 3 ML: at 02:02

## 2020-02-26 RX ADMIN — FENTANYL CITRATE 100 MCG: 50 INJECTION, SOLUTION INTRAMUSCULAR; INTRAVENOUS at 08:02

## 2020-02-26 RX ADMIN — OXYCODONE HYDROCHLORIDE AND ACETAMINOPHEN 1 TABLET: 7.5; 325 TABLET ORAL at 08:02

## 2020-02-26 RX ADMIN — SODIUM CHLORIDE, SODIUM LACTATE, POTASSIUM CHLORIDE, AND CALCIUM CHLORIDE: 600; 310; 30; 20 INJECTION, SOLUTION INTRAVENOUS at 08:02

## 2020-02-26 RX ADMIN — OXYCODONE HYDROCHLORIDE AND ACETAMINOPHEN 1 TABLET: 7.5; 325 TABLET ORAL at 01:02

## 2020-02-26 RX ADMIN — LIDOCAINE HYDROCHLORIDE 75 MG: 20 INJECTION, SOLUTION INTRAVENOUS at 08:02

## 2020-02-26 RX ADMIN — HYDROMORPHONE HYDROCHLORIDE 0.5 MG: 1 INJECTION, SOLUTION INTRAMUSCULAR; INTRAVENOUS; SUBCUTANEOUS at 03:02

## 2020-02-26 RX ADMIN — PROPOFOL 200 MG: 10 INJECTION, EMULSION INTRAVENOUS at 08:02

## 2020-02-26 RX ADMIN — ROCURONIUM BROMIDE 40 MG: 10 INJECTION, SOLUTION INTRAVENOUS at 08:02

## 2020-02-26 RX ADMIN — OXYCODONE HYDROCHLORIDE 5 MG: 5 TABLET ORAL at 10:02

## 2020-02-26 RX ADMIN — FENTANYL CITRATE 50 MCG: 50 INJECTION, SOLUTION INTRAMUSCULAR; INTRAVENOUS at 09:02

## 2020-02-26 RX ADMIN — HYDROMORPHONE HYDROCHLORIDE 0.4 MG: 2 INJECTION, SOLUTION INTRAMUSCULAR; INTRAVENOUS; SUBCUTANEOUS at 10:02

## 2020-02-26 RX ADMIN — MIDAZOLAM HYDROCHLORIDE 2 MG: 1 INJECTION, SOLUTION INTRAMUSCULAR; INTRAVENOUS at 08:02

## 2020-02-26 RX ADMIN — SODIUM CHLORIDE, SODIUM LACTATE, POTASSIUM CHLORIDE, AND CALCIUM CHLORIDE: 600; 310; 30; 20 INJECTION, SOLUTION INTRAVENOUS at 09:02

## 2020-02-26 RX ADMIN — FENTANYL CITRATE 50 MCG: 50 INJECTION, SOLUTION INTRAMUSCULAR; INTRAVENOUS at 08:02

## 2020-02-26 RX ADMIN — NEOSTIGMINE METHYLSULFATE 4 MG: 1 INJECTION INTRAVENOUS at 09:02

## 2020-02-26 RX ADMIN — GLYCOPYRROLATE 0.6 MG: 0.2 INJECTION, SOLUTION INTRAMUSCULAR; INTRAVENOUS at 09:02

## 2020-02-26 RX ADMIN — PIPERACILLIN SODIUM AND TAZOBACTAM SODIUM 4.5 G: 4; .5 INJECTION, POWDER, LYOPHILIZED, FOR SOLUTION INTRAVENOUS at 08:02

## 2020-02-26 RX ADMIN — CARBOXYMETHYLCELLULOSE SODIUM 2 DROP: 2.5 SOLUTION/ DROPS OPHTHALMIC at 08:02

## 2020-02-26 RX ADMIN — ONDANSETRON 4 MG: 2 INJECTION INTRAMUSCULAR; INTRAVENOUS at 09:02

## 2020-02-26 RX ADMIN — PIPERACILLIN SODIUM AND TAZOBACTAM SODIUM 4.5 G: 4; .5 INJECTION, POWDER, LYOPHILIZED, FOR SOLUTION INTRAVENOUS at 03:02

## 2020-02-26 RX ADMIN — PIPERACILLIN SODIUM AND TAZOBACTAM SODIUM 4.5 G: 4; .5 INJECTION, POWDER, LYOPHILIZED, FOR SOLUTION INTRAVENOUS at 01:02

## 2020-02-26 NOTE — PROGRESS NOTES
Received patient from PACU. Patient is awake, alert, and oriented. Able to drink water without signs of aspiration. Vital signs WNL. Patient was able to transfer self from stretcher to bed. Complained of some discomfort but did not want anything else for it. Scds on. IV infusion continues. Continue monitoring.

## 2020-02-26 NOTE — PLAN OF CARE
Patient is awake, alert, and oriented.  Purposeful hourly rounds done.  Vital signs WNL. Afebrile.  Pain managed with current PO regimen. Surgical dressings looked CDI. Abdominal binder was offered for comfort. Patient stated that it helps.  Peripheral IV intact. IV infusion and antibiotics continue.  Pt ambulated to the bathroom without difficulties. However, patient was instructed to call for help for she is a fall risk.   Tolerating diet well. No nausea or vomiting. Diet was advanced to low residue.   Voids spontaneously.  Reviewed plan of care with patient and daughter. Reassurance was provided.  Call light within reach. Bed at the lowest position.  Continue monitoring.

## 2020-02-26 NOTE — TRANSFER OF CARE
"Anesthesia Transfer of Care Note    Patient: Elisa Gongora    Procedure(s) Performed: Procedure(s) (LRB):  CHOLECYSTECTOMY, LAPAROSCOPIC (N/A)    Patient location: PACU    Anesthesia Type: general    Transport from OR: Transported from OR on 2-3 L/min O2 by NC with adequate spontaneous ventilation    Post pain: adequate analgesia    Post assessment: no apparent anesthetic complications    Post vital signs: stable    Level of consciousness: awake and responds to stimulation    Nausea/Vomiting: no nausea/vomiting    Complications: none    Transfer of care protocol was followed      Last vitals:   Visit Vitals  /80 (BP Location: Left arm)   Pulse 73   Temp 36.4 °C (97.6 °F) (Oral)   Resp 17   Ht 5' 6" (1.676 m)   Wt 96.3 kg (212 lb 4.9 oz)   SpO2 100%   Breastfeeding? No   BMI 34.27 kg/m²     "

## 2020-02-26 NOTE — PLAN OF CARE
Patient awake and alert. No change in neuro baseline. RUQ pain that radiates to upper and lower back. Worse with movement, resolved by rest & pain medication. No n/v/d overnight. IV in progress. NPO since midnight for possible mendel in am. Standard fall precautions in place. Pt remains fall and injury free.

## 2020-02-26 NOTE — OP NOTE
[facility]  Surgery Department  Operative Note    SUMMARY     Patient: Elisa Gongora    Medical Record: [id number]    Date of Procedure: 2/26/2020     Surgeon: Surgeon(s) and Role:     * Landon Dunne Jr., MD - Primary    Assisting Surgeon: None    Pre-Operative Diagnosis: Cholecystitis [K81.9]    Post-Operative Diagnosis: Post-Op Diagnosis Codes:     * Cholecystitis [K81.9]    Procedure: Procedure(s) (LRB):  CHOLECYSTECTOMY, LAPAROSCOPIC (N/A)    Procedure in Detail:  The patient brought the operating room and placed in supine position, the abdomen prepped and draped in sterile fashion.  Small incision made in the left upper quadrant and a Veress needle inserted, and pneumoperitoneum achieved. A 5 mm Optiview trocar was inserted in the right upper quadrant in the midclavicular line. Under direct observation 2 additional 5 mm trocars were inserted:  1 in the upper midline, another in the anterior axillary of the right upper quadrant. A 10 mm Optiview trocar was inserted approximately 5 cm above the umbilicus. This was also done under direct observation.  The gallbladder was observed and seen to be acutely inflamed.  Using blunt dissection adhesions to the gallbladder were taken down exposing the cystic artery and duct. These were doubly clamped proximally and then transected.  Using electrocautery Bovie the gallbladder was removed from the liver bed. Under direct observation with the aid of an Endo-Catch the gallbladder was removed through the periumbilical port. The pneumoperitoneum then reestablished and the peritoneal cavity inspected and irrigated. The pneumoperitoneum released and the fascia the umbilicus closed with 0 Vicryl.  Skin was closed with running 4 Monocryl. The patient tolerated procedure well left the operating room in good condition. At the end the procedure all sponge lap and instrument counts were correct. Estimated blood loss 20 cc

## 2020-02-26 NOTE — ANESTHESIA PREPROCEDURE EVALUATION
02/26/2020  Elisa Gongora is a 44 y.o., female.    Anesthesia Evaluation    I have reviewed the Patient Summary Reports.    I have reviewed the Nursing Notes.   I have reviewed the Medications.     Review of Systems  Anesthesia Hx:  No problems with previous Anesthesia  History of prior surgery of interest to airway management or planning: Previous anesthesia: Epidural  CS 20 yrs ago with epidural.  Denies Family Hx of Anesthesia complications.   Denies Personal Hx of Anesthesia complications.   Social:  Non-Smoker    Hematology/Oncology:  Hematology Normal   Oncology Normal     EENT/Dental:EENT/Dental Normal   Cardiovascular:   Exercise tolerance: good    Pulmonary:  Pulmonary Normal    Renal/:  Renal/ Normal     Hepatic/GI:  Hepatic/GI Normal Potassium 3.4   Musculoskeletal:  Musculoskeletal Normal    Neurological:  Neurology Normal    Endocrine:  Endocrine Normal    Dermatological:  Skin Normal    Psych:  Psychiatric Normal           Physical Exam  General:  Obesity    Airway/Jaw/Neck:  Airway Findings: Mouth Opening: Normal Tongue: Normal  General Airway Assessment: Adult  Mallampati: II      Dental:  Dental Findings: In tact        Mental Status:  Mental Status Findings:  Cooperative         Anesthesia Plan  Type of Anesthesia, risks & benefits discussed:  Anesthesia Type:  general  Patient's Preference:   Intra-op Monitoring Plan: standard ASA monitors  Intra-op Monitoring Plan Comments:   Post Op Pain Control Plan: per primary service following discharge from PACU and multimodal analgesia  Post Op Pain Control Plan Comments:   Induction:   IV  Beta Blocker:         Informed Consent: Patient understands risks and agrees with Anesthesia plan.  Questions answered. Anesthesia consent signed with patient.  ASA Score: 2     Day of Surgery Review of History & Physical:    H&P update referred to the  surgeon.         Ready For Surgery From Anesthesia Perspective.

## 2020-02-26 NOTE — ANESTHESIA POSTPROCEDURE EVALUATION
Anesthesia Post Evaluation    Patient: Elisa Gongora    Procedure(s) Performed: Procedure(s) (LRB):  CHOLECYSTECTOMY, LAPAROSCOPIC (N/A)    Final Anesthesia Type: general    Patient location during evaluation: PACU  Patient participation: Yes- Able to Participate  Level of consciousness: awake and alert  Post-procedure vital signs: reviewed and stable  Pain management: adequate  Airway patency: patent    PONV status at discharge: No PONV  Anesthetic complications: no      Cardiovascular status: blood pressure returned to baseline  Respiratory status: unassisted, spontaneous ventilation and room air  Hydration status: euvolemic  Follow-up not needed.          Vitals Value Taken Time   /75 2/26/2020 11:19 AM   Temp 36.3 °C (97.4 °F) 2/26/2020 11:19 AM   Pulse 74 2/26/2020 11:19 AM   Resp 16 2/26/2020 11:19 AM   SpO2 99 % 2/26/2020 11:19 AM         Event Time     Out of Recovery 02/26/2020 10:58:00          Pain/Benny Score: Pain Rating Prior to Med Admin: 5 (2/26/2020 10:28 AM)  Pain Rating Post Med Admin: 5 (2/26/2020 10:26 AM)  Benny Score: 10 (2/26/2020 10:45 AM)

## 2020-02-26 NOTE — ANESTHESIA PROCEDURE NOTES
Intubation  Performed by: Elizabeth Ohara CRNA  Authorized by: Griffin Luther MD     Intubation:     Induction:  Intravenous    Mask Ventilation:  Easy with oral airway    Attempts:  1    Attempted By:  CRNA    Method of Intubation:  Video laryngoscopy    Blade:  Larry 3    Laryngeal View Grade: Grade I - full view of chords      Difficult Airway Encountered?: No      Complications:  None    Airway Device:  Oral endotracheal tube    Airway Device Size:  7.5    Style/Cuff Inflation:  Cuffed    Inflation Amount (mL):  4    Placement Verified By:  Capnometry    Complicating Factors:  None    Findings Post-Intubation:  BS equal bilateral and atraumatic/condition of teeth unchanged

## 2020-02-26 NOTE — PLAN OF CARE
See previous discharge planning assessment completed by LAILA Sumner [facility]     02/25/20 2559   Discharge Assessment   Assessment Type Discharge Planning Assessment

## 2020-02-26 NOTE — NURSING
Pt transported off of unit via stretcher to holding to await surgery. Pt AAOx4, NAD. Pt reports 4/10 RUQ abd pain. VS stable. Pt remains afebrile. Pt has been NPO. Pt stable for transport.

## 2020-02-27 PROCEDURE — 63600175 PHARM REV CODE 636 W HCPCS: Performed by: SPECIALIST

## 2020-02-27 PROCEDURE — A4216 STERILE WATER/SALINE, 10 ML: HCPCS | Performed by: SPECIALIST

## 2020-02-27 PROCEDURE — 25000003 PHARM REV CODE 250: Performed by: SPECIALIST

## 2020-02-27 PROCEDURE — 94799 UNLISTED PULMONARY SVC/PX: CPT

## 2020-02-27 PROCEDURE — G0378 HOSPITAL OBSERVATION PER HR: HCPCS

## 2020-02-27 PROCEDURE — 94761 N-INVAS EAR/PLS OXIMETRY MLT: CPT

## 2020-02-27 RX ADMIN — PIPERACILLIN SODIUM AND TAZOBACTAM SODIUM 4.5 G: 4; .5 INJECTION, POWDER, LYOPHILIZED, FOR SOLUTION INTRAVENOUS at 03:02

## 2020-02-27 RX ADMIN — Medication 3 ML: at 10:02

## 2020-02-27 RX ADMIN — PIPERACILLIN SODIUM AND TAZOBACTAM SODIUM 4.5 G: 4; .5 INJECTION, POWDER, LYOPHILIZED, FOR SOLUTION INTRAVENOUS at 07:02

## 2020-02-27 RX ADMIN — OXYCODONE HYDROCHLORIDE AND ACETAMINOPHEN 1 TABLET: 7.5; 325 TABLET ORAL at 06:02

## 2020-02-27 RX ADMIN — OXYCODONE HYDROCHLORIDE AND ACETAMINOPHEN 1 TABLET: 7.5; 325 TABLET ORAL at 11:02

## 2020-02-27 RX ADMIN — PIPERACILLIN SODIUM AND TAZOBACTAM SODIUM 4.5 G: 4; .5 INJECTION, POWDER, LYOPHILIZED, FOR SOLUTION INTRAVENOUS at 11:02

## 2020-02-27 RX ADMIN — ACETAMINOPHEN 650 MG: 325 TABLET ORAL at 09:02

## 2020-02-27 NOTE — H&P
Chief Complaint/Reason for Admission:  Gallbladder disease     History of Present Illness:  Patient is a 44 y.o. female presents with a 2 day history of nausea, vomiting, and diarrhea.  Patient developed right upper quadrant pain this morning which was severe in nature.  She had a fever of 101.8 at home.  She denies weight loss, jaundice.        (Not in a hospital admission)     Review of patient's allergies indicates:  No Known Allergies     No past medical history on file.        Past Surgical History:   Procedure Laterality Date     SECTION          No family history on file.  Social History      Tobacco Use    Smoking status: Never Smoker   Substance Use Topics    Alcohol use: Yes    Drug use: Never         Review of Systems   Constitutional: Positive for activity change, appetite change, chills and fever. Negative for unexpected weight change.   HENT: Negative.    Respiratory: Negative.    Cardiovascular: Negative for chest pain, palpitations and leg swelling.   Gastrointestinal: Positive for abdominal distention, abdominal pain, diarrhea, nausea and vomiting.   Genitourinary: Negative for difficulty urinating, dysuria, flank pain, frequency, hematuria and urgency.   Musculoskeletal: Negative.    Skin: Negative for pallor and rash.   Neurological: Negative for dizziness, tremors, facial asymmetry, speech difficulty, weakness, light-headedness and numbness.   Psychiatric/Behavioral: Negative for agitation, behavioral problems and confusion. The patient is not nervous/anxious.       OBJECTIVE:      Vital Signs (Most Recent)  Temp: 98.3 °F (36.8 °C) (20 1842)  Pulse: 74 (20)  Resp: 16 (20 1354)  BP: (!) 142/86 (20)  SpO2: 99 % (20)     Physical Exam   Constitutional: She is oriented to person, place, and time. She appears well-developed and well-nourished. No distress.   HENT:   Head: Normocephalic and atraumatic.   Eyes: EOM are normal. No scleral icterus.    Neck: Neck supple. No JVD present. No tracheal deviation present.   Cardiovascular: Normal rate, regular rhythm and intact distal pulses. Exam reveals friction rub. Exam reveals no gallop.   Murmur heard.  Pulmonary/Chest: Effort normal and breath sounds normal. No respiratory distress. She has no wheezes. She has no rales.   Abdominal: Soft. She exhibits no mass. There is tenderness. There is no rebound. No hernia.   Musculoskeletal: Normal range of motion. She exhibits no tenderness or deformity.   Neurological: She is alert and oriented to person, place, and time. She exhibits normal muscle tone. Coordination normal.   Skin: Skin is warm and dry. No rash noted.   Psychiatric: She has a normal mood and affect. Her behavior is normal. Judgment and thought content normal.      Laboratory  CBC:       Recent Labs   Lab 02/24/20  1543   WBC 14.10*   RBC 4.25   HGB 10.7*   HCT 34.0*      MCV 80*   MCH 25.2*   MCHC 31.5*      CMP:       Recent Labs   Lab 02/24/20  1543   *   CALCIUM 9.4   ALBUMIN 4.0   PROT 7.7      K 4.5   CO2 23      BUN 13   CREATININE 1.0   ALKPHOS 79   ALT 16   AST 18   BILITOT 0.4          Recent Labs   Lab 02/24/20  1518   COLORU Yellow   SPECGRAV 1.020   PHUR 5.0   PROTEINUA Negative   NITRITE Negative   LEUKOCYTESUR Negative   UROBILINOGEN Negative         Diagnostic Results:  US: Reviewed  CT: Reviewed  CT scan of the abdomen ultrasound show cholelithiasis with pericholecystic edema.        Impression/plan  Cholecystitis, admit, cholecystectomy

## 2020-02-27 NOTE — PLAN OF CARE
Pt AAOx4, VSS on RA and afebrile. Pt free of trauma, injury, and skin breakdown. Pain controlled via PO prn medication. Lap sites x5 CDI with steri-strips.Tolerating ordered diet. Voiding spontaneously without difficulty. Ambulated in hallway with standby assist. Pt OOB to chair for meals. POC reviewed with pt and all questions answered. Pt has call light in reach, bed brakes on, side rails up x2, bed in low position, SCDs on, IS at bedside, and nonskid socks on. Purposeful hourly rounding and safety maintained. Cont to monitor.

## 2020-02-28 VITALS
TEMPERATURE: 98 F | HEIGHT: 66 IN | WEIGHT: 212.31 LBS | OXYGEN SATURATION: 99 % | SYSTOLIC BLOOD PRESSURE: 113 MMHG | DIASTOLIC BLOOD PRESSURE: 69 MMHG | RESPIRATION RATE: 16 BRPM | BODY MASS INDEX: 34.12 KG/M2 | HEART RATE: 64 BPM

## 2020-02-28 PROCEDURE — A4216 STERILE WATER/SALINE, 10 ML: HCPCS | Performed by: SPECIALIST

## 2020-02-28 PROCEDURE — G0378 HOSPITAL OBSERVATION PER HR: HCPCS

## 2020-02-28 PROCEDURE — 25000003 PHARM REV CODE 250: Performed by: SPECIALIST

## 2020-02-28 PROCEDURE — 63600175 PHARM REV CODE 636 W HCPCS: Performed by: SPECIALIST

## 2020-02-28 RX ORDER — HYDROCODONE BITARTRATE AND ACETAMINOPHEN 7.5; 325 MG/1; MG/1
1 TABLET ORAL EVERY 4 HOURS PRN
Qty: 20 TABLET | Refills: 0 | Status: SHIPPED | OUTPATIENT
Start: 2020-02-28 | End: 2020-09-21 | Stop reason: CLARIF

## 2020-02-28 RX ADMIN — OXYCODONE HYDROCHLORIDE AND ACETAMINOPHEN 1 TABLET: 7.5; 325 TABLET ORAL at 01:02

## 2020-02-28 RX ADMIN — PIPERACILLIN SODIUM AND TAZOBACTAM SODIUM 4.5 G: 4; .5 INJECTION, POWDER, LYOPHILIZED, FOR SOLUTION INTRAVENOUS at 03:02

## 2020-02-28 RX ADMIN — OXYCODONE HYDROCHLORIDE AND ACETAMINOPHEN 1 TABLET: 7.5; 325 TABLET ORAL at 08:02

## 2020-02-28 RX ADMIN — Medication 3 ML: at 05:02

## 2020-02-28 NOTE — PLAN OF CARE
No significant events overnight. Remains free from fall, injury, and skin breakdown. Voiding without difficulty. Ambulates independently. VSS on RA throughout night. Positions self independently. Pain well controlled with PRN percocet; denies pain. Abdominal lap sites x5 w/ steri strips CDI. Abdominal binder on. Maintenance fluids running. Zosyn treatment continued. Plan of care reviewed with patient and all questions answered. Bed low, locked. Call light within reach. Purposeful rounding performed. Resting comfortably in bed, no other complaints at this time.

## 2020-02-28 NOTE — PLAN OF CARE
Pt discharging home today, family to provide transportation hoem.    Pt confirms no CM needs or barriers for discharg.e     02/28/20 1049   Final Note   Assessment Type Final Discharge Note   Anticipated Discharge Disposition Home   What phone number can be called within the next 1-3 days to see how you are doing after discharge? [phone]   Hospital Follow Up  Appt(s) scheduled? Yes   Discharge plans and expectations educations in teach back method with documentation complete? Yes   Right Care Referral Info   Post Acute Recommendation No Care

## 2020-02-28 NOTE — NURSING
Pt alert and vs stable. Abdomen soft and nondistended c active BS noted. Pt reports passing gas. Medicated for pain c good relief. Dr Christensen here to see pt and discharge orders noted. IV dc'd . Pt verbalizes understanding of wound care and dc activities. Discharged home in stable condition.

## 2020-02-28 NOTE — DISCHARGE SUMMARY
[facility]  Discharge Summary      Date of Admission:2/24/2020    Discharge Date and Time:  02/28/2020 10:49 AM     History:  This 44-year-old black female presented with abdominal pain and was found to have gallstones.  She was taken to surgery on 02/26 and underwent a laparoscopic cholecystectomy.  Postoperatively she experienced some pain the 1st day which improved.  She is discharged to home today on a regular diet, limited activity, her normal medications plus Norco and will be seen back in the office in 2 weeks.

## 2020-03-06 LAB — FINAL PATHOLOGIC DIAGNOSIS: NORMAL

## 2020-03-25 ENCOUNTER — HOSPITAL ENCOUNTER (EMERGENCY)
Facility: OTHER | Age: 45
Discharge: HOME | End: 2020-03-25
Attending: EMERGENCY MEDICINE
Payer: COMMERCIAL

## 2020-03-25 VITALS
OXYGEN SATURATION: 99 % | WEIGHT: 200 LBS | RESPIRATION RATE: 24 BRPM | BODY MASS INDEX: 32.14 KG/M2 | TEMPERATURE: 99 F | DIASTOLIC BLOOD PRESSURE: 72 MMHG | HEART RATE: 106 BPM | SYSTOLIC BLOOD PRESSURE: 130 MMHG | HEIGHT: 66 IN

## 2020-03-25 DIAGNOSIS — R05.9 COUGH: ICD-10-CM

## 2020-03-25 DIAGNOSIS — J18.9 PNEUMONIA OF LEFT LOWER LOBE DUE TO INFECTIOUS ORGANISM: Primary | ICD-10-CM

## 2020-03-25 LAB
B-HCG UR QL: NEGATIVE
CTP QC/QA: YES

## 2020-03-25 PROCEDURE — 81025 URINE PREGNANCY TEST: CPT | Performed by: EMERGENCY MEDICINE

## 2020-03-25 PROCEDURE — 99284 EMERGENCY DEPT VISIT MOD MDM: CPT | Mod: 25

## 2020-03-25 RX ORDER — ONDANSETRON 4 MG/1
4 TABLET, ORALLY DISINTEGRATING ORAL EVERY 8 HOURS PRN
Qty: 20 TABLET | Refills: 0 | Status: SHIPPED | OUTPATIENT
Start: 2020-03-25 | End: 2020-09-21 | Stop reason: CLARIF

## 2020-03-25 RX ORDER — BENZONATATE 100 MG/1
100 CAPSULE ORAL 3 TIMES DAILY PRN
Qty: 20 CAPSULE | Refills: 0 | Status: SHIPPED | OUTPATIENT
Start: 2020-03-25 | End: 2020-04-04

## 2020-03-25 RX ORDER — AZITHROMYCIN 250 MG/1
250 TABLET, FILM COATED ORAL DAILY
Qty: 6 TABLET | Refills: 0 | Status: SHIPPED | OUTPATIENT
Start: 2020-03-25 | End: 2020-09-21 | Stop reason: CLARIF

## 2020-03-26 NOTE — ED PROVIDER NOTES
Encounter Date: 3/25/2020       History     Chief Complaint   Patient presents with    Shortness of Breath     +SOB, subjective fever, cough and diarrhea since this morning. respirations labored upon assessment. pt O2 99% RA, pt talking in complete sentences      Patient is a 44-year-old female with no significant medical history who presents to the emergency department with body aches and chills.  Patient states over the last week she has not been feeling well.  She reports cough with sputum production.  She reports diarrhea that started morning.  She states when she walks she feels very short of breath.  She states her temperature has been very high at home, but she has not been checking it.  She reports body aches and chills.  She reports night sweats.  She reports decrease in appetite and activity.  She states she feels very weak.  She denies any recent sick exposures.    The history is provided by the patient.     Review of patient's allergies indicates:  No Known Allergies  No past medical history on file.  Past Surgical History:   Procedure Laterality Date     SECTION      LAPAROSCOPIC CHOLECYSTECTOMY N/A 2020    Procedure: CHOLECYSTECTOMY, LAPAROSCOPIC;  Surgeon: Landon Dunne Jr., MD;  Location: [facility];  Service: General;  Laterality: N/A;     No family history on file.  Social History     Tobacco Use    Smoking status: Never Smoker   Substance Use Topics    Alcohol use: Yes    Drug use: Never     Review of Systems   Constitutional: Positive for activity change, appetite change, chills, fatigue and fever.   HENT: Positive for congestion. Negative for ear discharge, ear pain, postnasal drip, rhinorrhea, sore throat and trouble swallowing.    Respiratory: Positive for cough and shortness of breath.    Cardiovascular: Negative for chest pain.   Gastrointestinal: Positive for diarrhea. Negative for abdominal pain, blood in stool, constipation and vomiting.   Genitourinary: Negative for  dysuria, flank pain and hematuria.   Musculoskeletal: Positive for myalgias. Negative for back pain, neck pain and neck stiffness.   Skin: Negative for rash and wound.   Neurological: Positive for weakness (generalized). Negative for dizziness, facial asymmetry and light-headedness.       Physical Exam     Initial Vitals [03/25/20 1652]   BP Pulse Resp Temp SpO2   130/72 106 (!) 24 98.7 °F (37.1 °C) 99 %      MAP       --         Physical Exam    Nursing note and vitals reviewed.  Constitutional: She appears well-developed and well-nourished. She is not diaphoretic.  Non-toxic appearance. She has a sickly appearance. No distress.   HENT:   Head: Normocephalic.   Right Ear: Hearing and external ear normal.   Left Ear: Hearing and external ear normal.   Nose: Nose normal.   Mouth/Throat: Uvula is midline, oropharynx is clear and moist and mucous membranes are normal. No oropharyngeal exudate.   Eyes: Conjunctivae are normal. Pupils are equal, round, and reactive to light.   Neck: Normal range of motion.   Cardiovascular: Regular rhythm.   Tachycardic; no lower extremity edema   Pulmonary/Chest: Breath sounds normal. No respiratory distress. She has no wheezes. She has no rhonchi. She has no rales. She exhibits no tenderness.   Abdominal: Soft. Bowel sounds are normal. There is no tenderness.   Lymphadenopathy:     She has no cervical adenopathy.   Neurological: She is alert and oriented to person, place, and time. She has normal strength.   Skin: Skin is warm and dry. Capillary refill takes less than 2 seconds.   Psychiatric: She has a normal mood and affect.         ED Course   Procedures  Labs Reviewed   POCT URINE PREGNANCY          Imaging Results          X-Ray Chest AP Portable (Final result)  Result time 03/25/20 17:37:26    Final result by Mouna Maria MD (03/25/20 17:37:26)                 Impression:      As above.      Electronically signed by: Mouna Maria MD  Date:    03/25/2020  Time:    17:37              Narrative:    EXAMINATION:  XR CHEST AP PORTABLE    CLINICAL HISTORY:  Cough    TECHNIQUE:  Single frontal view of the chest was performed.    COMPARISON:  None    FINDINGS:  Cardiac silhouette is normal in size.  Lungs are symmetrically expanded.  Questionable focal airspace opacity within the left lower lung zone projecting over the left posterior 8th rib which could reflect focal infectious or inflammatory process.  No confluent focal consolidation seen.  No evidence of pneumothorax or significant pleural effusion.  No acute osseous abnormality identified.                              X-Rays:   Independently Interpreted Readings:   Other Readings:  Pissible left lower lung opacity    Medical Decision Making:   Initial Assessment:   Urgent evaluation of a 44 yr old female with no significant medical history who presents to the ED with cough and shortness of breath.  Patient is afebrile and nontoxic appearing.  She is hemodynamically stable.  She is slightly tachycardic with normal oxygen saturation on room air.  She is slightly tachypneic.  Lungs are clear to auscultation.  No lower extremity edema.  With patient's presentation, I am concerned for COVID.  She does not meet COVID testing criteria at this time.  Chest x-ray will be obtained.  Patient will be ambulated to ensure she does not desat.  Clinical Tests:   Radiological Study: Ordered and Reviewed  ED Management:  Chest x-ray reveals left lower lobe opacity.  Pt is not desatting.  Pt will be given abx to treat CAP although COVID is more likely.  She is given strict quarantine instructions.  She is given strict return precautions.                                   Clinical Impression:       ICD-10-CM ICD-9-CM   1. Pneumonia of left lower lobe due to infectious organism J18.1 486   2. Cough R05 786.2             ED Disposition Condition    Discharge Stable        ED Prescriptions     Medication Sig Dispense Start Date End Date Auth. Provider     azithromycin (Z-LEI) 250 MG tablet Take 1 tablet (250 mg total) by mouth once daily. Take first 2 tablets together, then 1 every day until finished. 6 tablet 3/25/2020  Padmini Birmingham PA-C    ondansetron (ZOFRAN-ODT) 4 MG TbDL Take 1 tablet (4 mg total) by mouth every 8 (eight) hours as needed (nausea). 20 tablet 3/25/2020  Padmini Birmingham PA-C    benzonatate (TESSALON) 100 MG capsule Take 1 capsule (100 mg total) by mouth 3 (three) times daily as needed. 20 capsule 3/25/2020 4/4/2020 Padmini Birmingham PA-C        Follow-up Information     Follow up With Specialties Details Why Contact Info Additional Information    [facility] Internal [facility]-[place] [address] Internal [address]   [address] [address] [phone] Internal Medicine - [facility], 8th Floor, [address] Please park in [address] and use [place] elevators    [facility] Emergency Medicine  If symptoms worsen return to ED [address] [address] [phone]                                      Padmini Birmingham PA-C  03/25/20 2056

## 2020-05-29 DIAGNOSIS — Z12.31 SCREENING MAMMOGRAM, ENCOUNTER FOR: ICD-10-CM

## 2020-05-29 DIAGNOSIS — N64.4 MASTALGIA: Primary | ICD-10-CM

## 2020-07-22 ENCOUNTER — TELEPHONE (OUTPATIENT)
Dept: OBSTETRICS AND GYNECOLOGY | Facility: CLINIC | Age: 45
End: 2020-07-22

## 2020-07-22 NOTE — TELEPHONE ENCOUNTER
Called patient, scheduled fib consult     ----- Message from Liam Alexandre sent at 7/22/2020  4:56 PM CDT -----  Name of Who is Calling: JAMAAL CHAMPION [[id number]]    What is the request in detail:Patient is requesting a call back  in regards to becoming a new Patient  Please contact to further discuss and advise      Can the clinic reply by [facility]: no     [facility]  [phone] (home)

## 2020-08-20 ENCOUNTER — OFFICE VISIT (OUTPATIENT)
Dept: OBSTETRICS AND GYNECOLOGY | Facility: CLINIC | Age: 45
End: 2020-08-20
Attending: OBSTETRICS & GYNECOLOGY
Payer: COMMERCIAL

## 2020-08-20 ENCOUNTER — TELEPHONE (OUTPATIENT)
Dept: OBSTETRICS AND GYNECOLOGY | Facility: CLINIC | Age: 45
End: 2020-08-20

## 2020-08-20 VITALS
HEIGHT: 66 IN | SYSTOLIC BLOOD PRESSURE: 130 MMHG | WEIGHT: 215.63 LBS | DIASTOLIC BLOOD PRESSURE: 90 MMHG | BODY MASS INDEX: 34.65 KG/M2

## 2020-08-20 DIAGNOSIS — Z01.818 PRE-OP TESTING: Primary | ICD-10-CM

## 2020-08-20 DIAGNOSIS — D25.1 FIBROIDS, INTRAMURAL: Primary | ICD-10-CM

## 2020-08-20 PROCEDURE — 99204 OFFICE O/P NEW MOD 45 MIN: CPT | Mod: S$PBB,,, | Performed by: OBSTETRICS & GYNECOLOGY

## 2020-08-20 PROCEDURE — 99999 PR PBB SHADOW E&M-EST. PATIENT-LVL V: CPT | Mod: PBBFAC,,, | Performed by: OBSTETRICS & GYNECOLOGY

## 2020-08-20 PROCEDURE — 99999 PR PBB SHADOW E&M-EST. PATIENT-LVL V: ICD-10-PCS | Mod: PBBFAC,,, | Performed by: OBSTETRICS & GYNECOLOGY

## 2020-08-20 PROCEDURE — 99204 PR OFFICE/OUTPT VISIT, NEW, LEVL IV, 45-59 MIN: ICD-10-PCS | Mod: S$PBB,,, | Performed by: OBSTETRICS & GYNECOLOGY

## 2020-08-20 PROCEDURE — 99215 OFFICE O/P EST HI 40 MIN: CPT | Mod: PBBFAC | Performed by: OBSTETRICS & GYNECOLOGY

## 2020-08-20 NOTE — PROGRESS NOTES
"CC: Symptoms related to fibroids    Elisa Gongora is a 45 y.o. female  presents for a consultation for management of fibroids.     She reports cycles are irregular and heavy. She usually has to stay at home. She uses Depends due to the heaviness. She reports clots. She is anemic and taking iron. She reports cycles are irregular. She denies dysmenorrhea. She reports painful intercourse, vaginally.     Ultrasound (under media tab), shows uterus to be approximately 12 cm, nabothian cysts on cervix and small fibroids      History reviewed. No pertinent past medical history.    Past Surgical History:   Procedure Laterality Date     SECTION      LAPAROSCOPIC CHOLECYSTECTOMY N/A 2020    Procedure: CHOLECYSTECTOMY, LAPAROSCOPIC;  Surgeon: Landon Dunne Jr., MD;  Location: [facility];  Service: General;  Laterality: N/A;       Family History   Problem Relation Age of Onset    Cancer Mother         Lung    Breast cancer Maternal Aunt     Colon cancer Neg Hx     Ovarian cancer Neg Hx        Social History     Tobacco Use    Smoking status: Never Smoker   Substance Use Topics    Alcohol use: Yes    Drug use: Never       OB History    Para Term  AB Living   4 3 3     3   SAB TAB Ectopic Multiple Live Births         1 3      # Outcome Date GA Lbr Cholo/2nd Weight Sex Delivery Anes PTL Lv   4 Term      CS-LTranv   RENEE   3 Term      Vag-Spont   RENEE   2 Term      Vag-Spont   RENEE   1A          FD   1B          FD       BP (!) 130/90   Ht 5' 6" (1.676 m)   Wt 97.8 kg (215 lb 9.8 oz)   LMP 2020   BMI 34.80 kg/m²     ROS:  GENERAL: Denies weight gain or weight loss. Feeling well overall.   SKIN: Denies rash or lesions.   HEAD: Denies head injury or headache.   NODES: Denies enlarged lymph nodes.   CHEST: Denies chest pain or shortness of breath.   CARDIOVASCULAR: Denies palpitations or left sided chest pain.   ABDOMEN: No abdominal pain, constipation, diarrhea, nausea, " vomiting or rectal bleeding.   URINARY: No frequency, dysuria, hematuria, or burning on urination.  REPRODUCTIVE: See HPI.   BREASTS: The patient performs breast self-examination and denies pain, lumps, or nipple discharge.   HEMATOLOGIC: No easy bruisability or excessive bleeding with the exception of menstrual cycles.  MUSCULOSKELETAL: Denies joint pain or swelling.   NEUROLOGIC: Denies syncope or weakness.   PSYCHIATRIC: Denies depression, anxiety or mood swings.    PHYSICAL EXAM:  APPEARANCE: Well nourished, well developed, in no acute distress.  AFFECT: WNL, alert and oriented x 3  SKIN: No acne or hirsutism  NECK: Neck symmetric without masses or thyromegaly  NODES: No inguinal, cervical, axillary, or femoral lymph node enlargement  CHEST: Good respiratory effect  ABDOMEN: Soft.  No tenderness or masses.  No hepatosplenomegaly.  No hernias.  PELVIC: Normal external genitalia without lesions.  Normal hair distribution.  Adequate perineal body, normal urethral meatus.  Vagina moist and well rugated without lesions or discharge.  Cervix pink, without lesions, discharge or tenderness.  No significant cystocele or rectocele.  Bimanual exam shows uterus to be approximately 14 week size, regular, mobile and nontender.  Adnexa without masses or tenderness.    EXTREMITIES: No edema.      ICD-10-CM ICD-9-CM    1. Fibroids, intramural  D25.1 218.1 US Pelvis Comp with Transvag NON-OB (xpd      Case Request Operating Room: HYSTERECTOMY, TOTAL, LAPAROSCOPIC, CYSTOSCOPY      Case Request Operating Room: HYSTERECTOMY, TOTAL, LAPAROSCOPIC, CYSTOSCOPY      Full code      Vital signs      Hayes to [place]      Insert peripheral IV      POCT glucose      Notify physician if BS > 180 for hysterectomy patients      Chlorhexidine (CHG) 2% Wipes      POCT urine pregnancy      Notify Physician/Vital Signs Parameters Urine output less than 0.5mL/kg/hr (with indwelling catheter) or 30 mL/hr (without indwelling catheter) or blood glucose  greater than 200 mg/dL      Notify physician      Diet NPO Except for: Medication      Place sequential compression device      Chlorohexidine Gluconate Bath      Type & Screen      CBC auto differential      Transfer patient      Admit to Phase I PACU, transfer to phase II level of care when Benny score is 9 out of 10      Vital signs      Straight Cath      Intake and output Per protocol      Apply warming blanket      POCT glucose      Notify Physician (specify)      Notify Physician      Notify Physician (specify)      Notify Anesthesiologist      Oxygen Continuous      Pulse Oximetry Continuous      Vital signs      Pulse Oximetry Q12H PRN      Remove dressing      Hayes catheter - discontinue      Diet Adult Regular (IDDSI Level 7) [facility]      DISCHARGE PATIENT 1) Tolerating PO, No emesis x 2 hours 2) Ambulates with assistance appropriate to age and health status (to baseline ability) 3) Voided or has been instructed on how to respond if difficulty voiding 4) Vital signs stable (within ...      Place in Outpatient         Patient was counseled today on treatment options for fibroids including low dose oral contraceptive pills, Orhiann, DepoLupron, UFE, DaVinci assisted myomectomy, abdominal myomectomy, and hysterectomy.  Patient desires a hysterectomy. TLH, BS, cysto scheduled on Sept 22.

## 2020-08-20 NOTE — PATIENT INSTRUCTIONS
Levonorgestrel intrauterine device (IUD)  What is this medicine?  LEVONORGESTREL IUD (GARCIA loco) is a contraceptive (birth control) device. The device is placed inside the uterus by a healthcare professional. It is used to prevent pregnancy. This device can also be used to treat heavy bleeding that occurs during your period.  How should I use this medicine?  This device is placed inside the uterus by a health care professional.  Talk to your pediatrician regarding the use of this medicine in children. Special care may be needed.  What side effects may I notice from receiving this medicine?  Side effects that you should report to your doctor or health care professional as soon as possible:  · allergic reactions like skin rash, itching or hives, swelling of the face, lips, or tongue  · fever, flu-like symptoms  · genital sores  · high blood pressure  · no menstrual period for 6 weeks during use  · pain, swelling, warmth in the leg  · pelvic pain or tenderness  · severe or sudden headache  · signs of pregnancy  · stomach cramping  · sudden shortness of breath  · trouble with balance, talking, or walking  · unusual vaginal bleeding, discharge  · yellowing of the eyes or skin  Side effects that usually do not require medical attention (report to your doctor or health care professional if they continue or are bothersome):  · acne  · breast pain  · change in sex drive or performance  · changes in weight  · cramping, dizziness, or faintness while the device is being inserted  · headache  · irregular menstrual bleeding within first 3 to 6 months of use  · nausea  What may interact with this medicine?  Do not take this medicine with any of the following medications:  · amprenavir  · bosentan  · fosamprenavir  This medicine may also interact with the following medications:  · aprepitant  · barbiturate medicines for inducing sleep or treating seizures  · bexarotene  · griseofulvin  · medicines to treat seizures  like carbamazepine, ethotoin, felbamate, oxcarbazepine, phenytoin, topiramate  · modafinil  · pioglitazone  · rifabutin  · rifampin  · rifapentine  · some medicines to treat HIV infection like atazanavir, indinavir, lopinavir, nelfinavir, tipranavir, ritonavir  · Kathy's wort  · warfarin  What if I miss a dose?  This does not apply. Depending on the brand of device you have inserted, the device will need to be replaced every 3 to 5 years if you wish to continue using this type of birth control.  Where should I keep my medicine?  This does not apply.  What should I tell my health care provider before I take this medicine?  They need to know if you have any of these conditions:  · abnormal Pap smear  · cancer of the breast, uterus, or cervix  · diabetes  · endometritis  · genital or pelvic infection now or in the past  · have more than one sexual partner or your partner has more than one partner  · heart disease  · history of an ectopic or tubal pregnancy  · immune system problems  · IUD in place  · liver disease or tumor  · problems with blood clots or take blood-thinners  · use intravenous drugs  · uterus of unusual shape  · vaginal bleeding that has not been explained  · an unusual or allergic reaction to levonorgestrel, other hormones, silicone, or polyethylene, medicines, foods, dyes, or preservatives  · pregnant or trying to get pregnant  · breast-feeding  What should I watch for while using this medicine?  Visit your doctor or health care professional for regular check ups. See your doctor if you or your partner has sexual contact with others, becomes HIV positive, or gets a sexual transmitted disease.  This product does not protect you against HIV infection (AIDS) or other sexually transmitted diseases.  You can check the placement of the IUD yourself by reaching up to the top of your vagina with clean fingers to feel the threads. Do not pull on the threads. It is a good habit to check placement after each  menstrual period. Call your doctor right away if you feel more of the IUD than just the threads or if you cannot feel the threads at all.  The IUD may come out by itself. You may become pregnant if the device comes out. If you notice that the IUD has come out use a backup birth control method like condoms and call your health care provider.  Using tampons will not change the position of the IUD and are okay to use during your period.  This IUD can be safely scanned with magnetic resonance imaging (MRI) only under specific conditions. Before you have an MRI, tell your healthcare provider that you have an IUD in place, and which type of IUD you have in place.  NOTE:This sheet is a summary. It may not cover all possible information. If you have questions about this medicine, talk to your doctor, pharmacist, or health care provider. Copyright© 2017 [employer]        Endometrial Ablation  Endometrial ablation is an outpatient surgery that can reduce or stop heavy menstrual bleeding. Ablation destroys the lining of the uterus. This surgery is for women who do not want to have any more children and who have not yet entered menopause. It should not be used by women with endometrial hyperplasia or cancer of the uterus.  Treatment takes less than an hour, and you can go home later that day.  Preparing for surgery  · You may be given medicine by mouth or injection for a few weeks or months before your ablation. This thins the lining of the uterus and reduces bleeding.  · Your health care provider may recommend other procedures to check the inside of your uterus before the ablation is done.   · The day before surgery, you may be given medicine or a special substance (laminaria) may be put into your cervix (the opening to the uterus). This widens the opening.  · To help prevent problems with anesthesia, do not eat or drink anything 10 hours before surgery.  Your surgery     Destroying the lining with heat, freezing, or  electric current prevents the lining from growing back.      · Youll be given anesthesia so you stay comfortable and relaxed and feel no pain during surgery.  · Then, your uterus may be filled with fluid. This puts pressure on the lining to help reduce bleeding. It also allows your health care provider to see inside your uterus.  · Next your health care provider puts a small telescope-like instrument through the cervix. This scope may be connected to a video monitor. This helps your health care provider see and control the ablation process. At the end of the scope, a device using heat, freezing, or electric current destroys the uterine lining. Instead of the scope, your health care provider may use a device that both expands and destroys the uterine lining. After being inserted into your uterus, it also uses heat or other energy to destroy the lining. Your health care provider will choose the device thats best for you.  Your recovery  · You may have cramping or aching in your abdomen after surgery. Your health care provider can give you pain medicine.  · You may also have a bloody or watery discharge or bleeding for days or weeks. Use sanitary pads, not tampons.  · Dont have sexual intercourse or play active sports for 2 weeks after surgery.  · You can likely return to work in 2 days.  · Ask your health care provider about using contraception after an ablation.  · Your health care provider will see you in about 6 weeks to be sure youre healing well.  Call your health care provider if you have any of the following after surgery:  · Persistent or increased abdominal pain  · Shortness of breath  · Heavy vaginal bleeding  · Fever over 100.4°F (38°C) or chills  · Nausea  · Frequent urination for 24 hours   Date Last Reviewed: 5/10/2015  © 9218-3094 [employer]. [address] [place] [address], [place] [address]. All rights reserved. This information is not intended as a substitute for professional medical care.  Always follow your healthcare professional's instructions.        Laparoscopic Hysterectomy: Your Experience  Talk to your healthcare provider about how to get ready for your surgery. Your healthcare providers will talk with you about what to expect as surgery draws near. Keep in mind that your experience may differ from that of other women you know.  Before the day of surgery  Your instructions may include the following:  · Stop taking certain medicines (including aspirin) for as many days before surgery as directed.  · If you smoke, stop as long as possible before surgery.  · Do not eat or drink anything after midnight the night before surgery. This includes chewing gum and mints.  · Arrange ahead of time for a ride home from the hospital or surgery center.  · If it is prescribed, take medicine to clean out your bowels the day before surgery. Your healthcare provider can give you more details about this.  On the day of surgery  Youll change into a gown. Youll then be prepped for your procedure:  · The anesthesiologist or nurse anesthetist will discuss anesthesia with you and answer any questions you have.  · Some pubic hair may be shaved.  · An IV (intravenous) line will be put into your arm or hand. This line supplies you with medicines and fluids before, during, and after surgery.  · You may be given medicine that helps you relax. You will then be given general anesthesia to make you sleep and keep you free from pain during surgery.  Risks and complications of laparoscopic hysterectomy  Once you understand these risks, you will be asked to sign a consent form. Risks and possible complications include:  · Side effects from anesthesia  · Infection  · Bleeding, with a possible need for a transfusion  · Blood clots  · Damage to the bladder, bowel, ureters, or nearby nerves or blood vessels  · Formation of scar tissue that may cause pain or bowel obstruction in the future (more common with abdominal  approach)  · Need for a second surgery   Date Last Reviewed: 3/1/2017  © 8157-6645 [employer]. [address] [place] [address]. All rights reserved. This information is not intended as a substitute for professional medical care. Always follow your healthcare professional's instructions.        Laparoscopic Hysterectomy: Your Home Recovery  When you leave the hospital, youll receive instructions on caring for yourself at home. Following these instructions helps ensure a faster recovery. It often takes about 1 week to 4 weeks to recover from laparoscopic hysterectomy. But recovery time varies from woman to woman.    Taking care of yourself  Follow these tips to make your recovery as safe and comfortable as possible:  · To avoid constipation, eat fruits, vegetables, and whole grains. Drink plenty of water. Your healthcare provider may suggest that you use a laxative or a mild stool softener.  · Ask your friends and family to help with chores and errands while you recover.  · Do not lift anything over 10 pounds to avoid straining your incisions.  · Do not get your incisions wet until your healthcare provider says its OK to do so.  · Do not put anything in the vagina until your provider says its safe to do so. This includes using tampons and douches and having sexual intercourse.  · Schedule follow-up visits with your healthcare provider.  When to call your healthcare provider  Call your healthcare provider if you notice any of these:  · Chills or a fever of 100.4°F (38°C) or higher, or as advised  · Bright red vaginal bleeding or a smelly discharge  · Trouble urinating or burning during urination  · Severe abdominal pain or bloating  · A red, swollen, or draining smelly fluid from the incision sites  · Trouble breathing or fainting  · Swollen painful leg   Date Last Reviewed: 3/1/2017  © 0327-2883 [employer]. [address] [place] [address]. All rights reserved. This  information is not intended as a substitute for professional medical care. Always follow your healthcare professional's instructions.        Laparoscopic Hysterectomy: Your Hospital Recovery     Walking soon after surgery helps prevent blood clots and breathing problems.     After your procedure, you may go home later the same day of your surgery. Or, you may stay in the hospital or surgery center for 1 to 2 days. Your healthcare providers will help with your recovery during this time. Before you go home, get answers to any questions you have.  Recovery right after surgery  After the procedure is finished, youll spend a few hours in the recovery area. You may feel drowsy or nauseated from the anesthesia. You may also have shoulder pain due to the gas used to expand your abdomen. Your throat may be sore and you may be hoarse due to the breathing tube used during surgery.  What to expect during your hospital stay  During your stay in the hospital or surgery center, the staff will look after you and help you prepare to go home. During this time:  · You will be asked to walk around to help improve breathing and blood flow.  · You will be given medicine for any pain you have. Tell your nurse if you are uncomfortable.  · Incisions may leak a small amount of pinkish fluid for the first couple of days.  · Spotting from the vagina may occur. If so, ask the nurse for a sanitary pad.  · You will be shown how to clear your lungs to prevent infections.  · The catheter may be left in place for a short time after surgery. Youll be checked to make sure you can urinate without the catheter before you go home.  · Your meals can include solid food as soon as you feel ready.  Date Last Reviewed: 3/1/2017  © 0559-6462 [employer]. [address] [place] [address], [place] [address]. All rights reserved. This information is not intended as a substitute for professional medical care. Always follow your healthcare professional's  instructions.

## 2020-08-20 NOTE — LETTER
August 20, 2020    Elisa Gongora  [address]  [facility] [address]         [facility] [address]  [address] [facility]  [address]  [facility] [address]  Phone: [phone]  Fax: [phone] August 20, 2020     Patient: Elisa Gongora   YOB: 1975   Date of Visit: 8/20/2020       To Whom It May Concern:    It is my medical opinion that Elisa Gongora is scheduled to have Surgery on September 22, 2020. She is scheduled to have a pre-op and pre- admit appointment on September 21, 2020 and also scheduled to be Covid tested for Pre op on September 19, 2020. Her estimated recovery time is 4-6 weeks.    If you have any questions or concerns, please don't hesitate to call.    Sincerely,        Cyndie Hameed MD

## 2020-08-23 RX ORDER — HYDROMORPHONE HYDROCHLORIDE 2 MG/ML
0.2 INJECTION, SOLUTION INTRAMUSCULAR; INTRAVENOUS; SUBCUTANEOUS EVERY 5 MIN PRN
Status: CANCELLED | OUTPATIENT
Start: 2020-08-23

## 2020-08-23 RX ORDER — SODIUM CHLORIDE 0.9 % (FLUSH) 0.9 %
3 SYRINGE (ML) INJECTION
Status: CANCELLED | OUTPATIENT
Start: 2020-08-23

## 2020-08-23 RX ORDER — IBUPROFEN 200 MG
800 TABLET ORAL
Status: CANCELLED | OUTPATIENT
Start: 2020-08-24

## 2020-08-23 RX ORDER — POLYETHYLENE GLYCOL 3350 17 G/17G
17 POWDER, FOR SOLUTION ORAL DAILY
Status: CANCELLED | OUTPATIENT
Start: 2020-08-24

## 2020-08-23 RX ORDER — ACETAMINOPHEN 500 MG
1000 TABLET ORAL
Status: CANCELLED | OUTPATIENT
Start: 2020-08-23

## 2020-08-23 RX ORDER — OXYCODONE AND ACETAMINOPHEN 10; 325 MG/1; MG/1
1 TABLET ORAL EVERY 4 HOURS PRN
Status: CANCELLED | OUTPATIENT
Start: 2020-08-23

## 2020-08-23 RX ORDER — DIPHENHYDRAMINE HYDROCHLORIDE 50 MG/ML
25 INJECTION INTRAMUSCULAR; INTRAVENOUS EVERY 4 HOURS PRN
Status: CANCELLED | OUTPATIENT
Start: 2020-08-23

## 2020-08-23 RX ORDER — MEPERIDINE HYDROCHLORIDE 100 MG/ML
12.5 INJECTION INTRAMUSCULAR; INTRAVENOUS; SUBCUTANEOUS ONCE AS NEEDED
Status: CANCELLED | OUTPATIENT
Start: 2020-08-23 | End: 2020-08-24

## 2020-08-23 RX ORDER — FAMOTIDINE 20 MG/1
20 TABLET, FILM COATED ORAL
Status: CANCELLED | OUTPATIENT
Start: 2020-08-23

## 2020-08-23 RX ORDER — MUPIROCIN 20 MG/G
OINTMENT TOPICAL
Status: CANCELLED | OUTPATIENT
Start: 2020-08-23

## 2020-08-23 RX ORDER — DIPHENHYDRAMINE HCL 25 MG
25 CAPSULE ORAL EVERY 4 HOURS PRN
Status: CANCELLED | OUTPATIENT
Start: 2020-08-23

## 2020-08-23 RX ORDER — OXYCODONE AND ACETAMINOPHEN 5; 325 MG/1; MG/1
1 TABLET ORAL EVERY 4 HOURS PRN
Status: CANCELLED | OUTPATIENT
Start: 2020-08-23

## 2020-08-23 RX ORDER — KETOROLAC TROMETHAMINE 30 MG/ML
30 INJECTION, SOLUTION INTRAMUSCULAR; INTRAVENOUS
Status: CANCELLED | OUTPATIENT
Start: 2020-08-23 | End: 2020-08-24

## 2020-08-23 RX ORDER — ACETAMINOPHEN 325 MG/1
650 TABLET ORAL EVERY 4 HOURS PRN
Status: CANCELLED | OUTPATIENT
Start: 2020-08-23

## 2020-08-23 RX ORDER — PREGABALIN 25 MG/1
50 CAPSULE ORAL
Status: CANCELLED | OUTPATIENT
Start: 2020-08-23

## 2020-08-23 RX ORDER — CELECOXIB 100 MG/1
400 CAPSULE ORAL
Status: CANCELLED | OUTPATIENT
Start: 2020-08-23

## 2020-08-23 RX ORDER — LIDOCAINE HYDROCHLORIDE 10 MG/ML
0.5 INJECTION, SOLUTION EPIDURAL; INFILTRATION; INTRACAUDAL; PERINEURAL
Status: CANCELLED | OUTPATIENT
Start: 2020-08-23

## 2020-08-23 RX ORDER — ONDANSETRON 4 MG/1
8 TABLET, ORALLY DISINTEGRATING ORAL EVERY 8 HOURS PRN
Status: CANCELLED | OUTPATIENT
Start: 2020-08-23

## 2020-08-23 RX ORDER — HYDROMORPHONE HYDROCHLORIDE 2 MG/ML
1 INJECTION, SOLUTION INTRAMUSCULAR; INTRAVENOUS; SUBCUTANEOUS EVERY 4 HOURS PRN
Status: CANCELLED | OUTPATIENT
Start: 2020-08-23

## 2020-08-23 RX ORDER — OXYCODONE HYDROCHLORIDE 5 MG/1
5 TABLET ORAL
Status: CANCELLED | OUTPATIENT
Start: 2020-08-23

## 2020-08-23 RX ORDER — AMOXICILLIN 250 MG
1 CAPSULE ORAL 2 TIMES DAILY
Status: CANCELLED | OUTPATIENT
Start: 2020-08-23

## 2020-08-23 RX ORDER — ONDANSETRON 2 MG/ML
4 INJECTION INTRAMUSCULAR; INTRAVENOUS DAILY PRN
Status: CANCELLED | OUTPATIENT
Start: 2020-08-23

## 2020-08-23 RX ORDER — SODIUM CHLORIDE, SODIUM LACTATE, POTASSIUM CHLORIDE, CALCIUM CHLORIDE 600; 310; 30; 20 MG/100ML; MG/100ML; MG/100ML; MG/100ML
INJECTION, SOLUTION INTRAVENOUS CONTINUOUS
Status: CANCELLED | OUTPATIENT
Start: 2020-08-23

## 2020-09-19 ENCOUNTER — LAB VISIT (OUTPATIENT)
Dept: SPORTS MEDICINE | Facility: CLINIC | Age: 45
End: 2020-09-19
Payer: COMMERCIAL

## 2020-09-19 DIAGNOSIS — Z01.818 PRE-OP TESTING: ICD-10-CM

## 2020-09-19 PROCEDURE — U0003 INFECTIOUS AGENT DETECTION BY NUCLEIC ACID (DNA OR RNA); SEVERE ACUTE RESPIRATORY SYNDROME CORONAVIRUS 2 (SARS-COV-2) (CORONAVIRUS DISEASE [COVID-19]), AMPLIFIED PROBE TECHNIQUE, MAKING USE OF HIGH THROUGHPUT TECHNOLOGIES AS DESCRIBED BY CMS-2020-01-R: HCPCS

## 2020-09-20 LAB — SARS-COV-2 RNA RESP QL NAA+PROBE: NOT DETECTED

## 2020-09-21 ENCOUNTER — OFFICE VISIT (OUTPATIENT)
Dept: OBSTETRICS AND GYNECOLOGY | Facility: CLINIC | Age: 45
End: 2020-09-21
Attending: OBSTETRICS & GYNECOLOGY
Payer: MEDICAID

## 2020-09-21 ENCOUNTER — TELEPHONE (OUTPATIENT)
Dept: OBSTETRICS AND GYNECOLOGY | Facility: CLINIC | Age: 45
End: 2020-09-21

## 2020-09-21 ENCOUNTER — HOSPITAL ENCOUNTER (OUTPATIENT)
Dept: PREADMISSION TESTING | Facility: OTHER | Age: 45
Discharge: HOME | End: 2020-09-21
Attending: OBSTETRICS & GYNECOLOGY
Payer: COMMERCIAL

## 2020-09-21 ENCOUNTER — ANESTHESIA EVENT (OUTPATIENT)
Dept: SURGERY | Facility: OTHER | Age: 45
End: 2020-09-21
Payer: COMMERCIAL

## 2020-09-21 VITALS
WEIGHT: 210 LBS | TEMPERATURE: 98 F | BODY MASS INDEX: 33.75 KG/M2 | OXYGEN SATURATION: 98 % | DIASTOLIC BLOOD PRESSURE: 83 MMHG | HEIGHT: 66 IN | HEART RATE: 78 BPM | SYSTOLIC BLOOD PRESSURE: 139 MMHG

## 2020-09-21 VITALS
HEIGHT: 66 IN | SYSTOLIC BLOOD PRESSURE: 126 MMHG | DIASTOLIC BLOOD PRESSURE: 74 MMHG | WEIGHT: 214.5 LBS | BODY MASS INDEX: 34.47 KG/M2

## 2020-09-21 DIAGNOSIS — D25.1 FIBROIDS, INTRAMURAL: ICD-10-CM

## 2020-09-21 DIAGNOSIS — Z01.818 PREOPERATIVE EXAM FOR GYNECOLOGIC SURGERY: Primary | ICD-10-CM

## 2020-09-21 LAB
ABO + RH BLD: NORMAL
BASOPHILS # BLD AUTO: 0.06 K/UL (ref 0–0.2)
BASOPHILS NFR BLD: 0.4 % (ref 0–1.9)
BLD GP AB SCN CELLS X3 SERPL QL: NORMAL
DIFFERENTIAL METHOD: ABNORMAL
EOSINOPHIL # BLD AUTO: 0.1 K/UL (ref 0–0.5)
EOSINOPHIL NFR BLD: 0.6 % (ref 0–8)
ERYTHROCYTE [DISTWIDTH] IN BLOOD BY AUTOMATED COUNT: 18.8 % (ref 11.5–14.5)
HCT VFR BLD AUTO: 33.7 % (ref 37–48.5)
HGB BLD-MCNC: 10.3 G/DL (ref 12–16)
IMM GRANULOCYTES # BLD AUTO: 0.04 K/UL (ref 0–0.04)
IMM GRANULOCYTES NFR BLD AUTO: 0.3 % (ref 0–0.5)
LYMPHOCYTES # BLD AUTO: 2.5 K/UL (ref 1–4.8)
LYMPHOCYTES NFR BLD: 18.2 % (ref 18–48)
MCH RBC QN AUTO: 24.6 PG (ref 27–31)
MCHC RBC AUTO-ENTMCNC: 30.6 G/DL (ref 32–36)
MCV RBC AUTO: 81 FL (ref 82–98)
MONOCYTES # BLD AUTO: 1 K/UL (ref 0.3–1)
MONOCYTES NFR BLD: 7.6 % (ref 4–15)
NEUTROPHILS # BLD AUTO: 9.8 K/UL (ref 1.8–7.7)
NEUTROPHILS NFR BLD: 72.9 % (ref 38–73)
NRBC BLD-RTO: 0 /100 WBC
PLATELET # BLD AUTO: 392 K/UL (ref 150–350)
PMV BLD AUTO: 11 FL (ref 9.2–12.9)
RBC # BLD AUTO: 4.18 M/UL (ref 4–5.4)
WBC # BLD AUTO: 13.5 K/UL (ref 3.9–12.7)

## 2020-09-21 PROCEDURE — 36415 COLL VENOUS BLD VENIPUNCTURE: CPT

## 2020-09-21 PROCEDURE — 99999 PR PBB SHADOW E&M-EST. PATIENT-LVL III: ICD-10-PCS | Mod: PBBFAC,,, | Performed by: OBSTETRICS & GYNECOLOGY

## 2020-09-21 PROCEDURE — 86850 RBC ANTIBODY SCREEN: CPT

## 2020-09-21 PROCEDURE — 99499 NO LOS: ICD-10-PCS | Mod: S$PBB,,, | Performed by: OBSTETRICS & GYNECOLOGY

## 2020-09-21 PROCEDURE — 99499 UNLISTED E&M SERVICE: CPT | Mod: S$PBB,,, | Performed by: OBSTETRICS & GYNECOLOGY

## 2020-09-21 PROCEDURE — 85025 COMPLETE CBC W/AUTO DIFF WBC: CPT

## 2020-09-21 PROCEDURE — 99213 OFFICE O/P EST LOW 20 MIN: CPT | Mod: PBBFAC,25 | Performed by: OBSTETRICS & GYNECOLOGY

## 2020-09-21 PROCEDURE — 99999 PR PBB SHADOW E&M-EST. PATIENT-LVL III: CPT | Mod: PBBFAC,,, | Performed by: OBSTETRICS & GYNECOLOGY

## 2020-09-21 RX ORDER — FERROUS GLUCONATE 324(38)MG
324 TABLET ORAL 2 TIMES DAILY
COMMUNITY

## 2020-09-21 NOTE — H&P (VIEW-ONLY)
"CC: Preop exam    Elisa Gongora is a 45 y.o. female  presents for a pre-op exam for a [facility], cystoscopy scheduled on 2020.      She reports cycles are irregular and heavy. She usually has to stay at home. She uses Depends due to the heaviness. She reports clots. She is anemic and taking iron. She reports cycles are irregular. She denies dysmenorrhea. She reports painful intercourse, vaginally.      Ultrasound (under media tab), shows uterus to be approximately 12 cm, nabothian cysts on cervix and small fibroids    History reviewed. No pertinent past medical history.    Past Surgical History:   Procedure Laterality Date     SECTION      LAPAROSCOPIC CHOLECYSTECTOMY N/A 2020    Procedure: CHOLECYSTECTOMY, LAPAROSCOPIC;  Surgeon: Landon Dunne Jr., MD;  Location: [facility];  Service: General;  Laterality: N/A;       OB History    Para Term  AB Living   4 3 3     3   SAB TAB Ectopic Multiple Live Births         1 3      # Outcome Date GA Lbr Cholo/2nd Weight Sex Delivery Anes PTL Lv   4 Term      CS-LTranv   RENEE   3 Term      Vag-Spont   RENEE   2 Term      Vag-Spont   RENEE   1A          FD   1B          FD       Family History   Problem Relation Age of Onset    Cancer Mother         Lung    Breast cancer Maternal Aunt     Colon cancer Neg Hx     Ovarian cancer Neg Hx        Social History     Tobacco Use    Smoking status: Never Smoker    Smokeless tobacco: Never Used   Substance Use Topics    Alcohol use: Yes     Comment: social    Drug use: Never       /74 (BP Location: Left arm, Patient Position: Sitting, BP Method: Medium (Manual))   Ht 5' 6" (1.676 m)   Wt 97.3 kg (214 lb 8.1 oz)   LMP  (LMP Unknown)   BMI 34.62 kg/m²     Current Outpatient Medications on File Prior to Visit   Medication Sig Dispense Refill    acetaminophen (TYLENOL) 325 MG tablet Take 325 mg by mouth every 6 (six) hours as needed for Pain.      [DISCONTINUED] azithromycin " (Z-LEI) 250 MG tablet Take 1 tablet (250 mg total) by mouth once daily. Take first 2 tablets together, then 1 every day until finished. 6 tablet 0    [DISCONTINUED] diclofenac sodium (VOLTAREN) 1 % Gel Apply 2 g topically 4 (four) times daily. 1 Tube 0    [DISCONTINUED] HYDROcodone-acetaminophen (NORCO) 7.5-325 mg per tablet Take 1 tablet by mouth every 4 (four) hours as needed. 20 tablet 0    [DISCONTINUED] ibuprofen (ADVIL,MOTRIN) 600 MG tablet Take 1 tablet (600 mg total) by mouth every 6 (six) hours as needed for Pain. 20 tablet 0    [DISCONTINUED] ondansetron (ZOFRAN-ODT) 4 MG TbDL Take 1 tablet (4 mg total) by mouth every 8 (eight) hours as needed (nausea). 20 tablet 0     No current facility-administered medications on file prior to visit.         Review of patient's allergies indicates:  No Known Allergies     ROS:  GENERAL: Denies weight gain or weight loss. Feeling well overall.   SKIN: Denies rash or lesions.   HEAD: Denies head injury or headache.   NODES: Denies enlarged lymph nodes.   CHEST: Denies chest pain or shortness of breath.   CARDIOVASCULAR: Denies palpitations or left sided chest pain.   ABDOMEN: No abdominal pain, constipation, diarrhea, nausea, vomiting or rectal bleeding.   URINARY: No frequency, dysuria, hematuria, or burning on urination.  REPRODUCTIVE: See HPI.   HEMATOLOGIC: No easy bruisability or excessive bleeding with the exception of menstrual cycles.  MUSCULOSKELETAL: Denies joint pain or swelling.   NEUROLOGIC: Denies syncope or weakness.   PSYCHIATRIC: Denies depression, anxiety or mood swings.    PHYSICAL EXAM:  APPEARANCE: Well nourished, well developed, in no acute distress.  AFFECT: WNL, alert and oriented x 3  SKIN: No acne or hirsutism  NECK: Neck symmetric without masses or thyromegaly  NODES: No inguinal, cervical, axillary, or femoral lymph node enlargement  CHEST: Good respiratory effect  ABDOMEN: Soft.  No tenderness or masses.  No hepatosplenomegaly.  No  hernias.  PELVIC: Deferred  EXTREMITIES: No edema.      ICD-10-CM ICD-9-CM    1. Preoperative exam for gynecologic surgery  Z01.818 V72.83        Patient was counseled today on treatment options for fibroids including low dose oral contraceptive pills, Orhiann, DepoLupron, UFE, DaVinci assisted myomectomy, abdominal myomectomy, and hysterectomy.  Patient desires a hysterectomy. Discussed ovarian conservation versus oophorectomy. R/B discussed. Patient desires oophorectomy.     Surgical risks discussed including but not limited to risk of bleeding, infection, cystotomy due to previous , and risk of laparotomy.         I have discussed the risks, benefits, indications, and alternatives of the procedure in detail.  The patient verbalizes her understanding.  All questions answered.  Consents signed.  The patient agrees to proceed to proceed as planned.

## 2020-09-21 NOTE — ANESTHESIA PREPROCEDURE EVALUATION
09/21/2020  Elisa Gongora is a 45 y.o., female.    Anesthesia Evaluation    I have reviewed the Patient Summary Reports.    I have reviewed the Nursing Notes.    I have reviewed the Medications.     Review of Systems  Anesthesia Hx:  No problems with previous Anesthesia Cholecystectomy in Feb 2020 History of prior surgery of interest to airway management or planning: Previous anesthesia: General Airway issues documented on chart review include videolaryngoscope used  Denies Family Hx of Anesthesia complications.   Denies Personal Hx of Anesthesia complications.   Social:  Non-Smoker    Hematology/Oncology:  Hematology Normal   Oncology Normal     EENT/Dental:EENT/Dental Normal   Cardiovascular:  Cardiovascular Normal     Pulmonary:  Pulmonary Normal    Renal/:  Renal/ Normal     Hepatic/GI:  Hepatic/GI Normal    Musculoskeletal:  Musculoskeletal Normal    Neurological:  Neurology Normal    Endocrine:  Endocrine Normal    Dermatological:  Skin Normal    Psych:  Psychiatric Normal           Physical Exam  General:  Well nourished, Obesity      Dental:  Dental Findings: In tact        Mental Status:  Mental Status Findings:  Cooperative, Alert and Oriented         Anesthesia Plan  Type of Anesthesia, risks & benefits discussed:  Anesthesia Type:  general  Patient's Preference:   Intra-op Monitoring Plan: standard ASA monitors  Intra-op Monitoring Plan Comments:   Post Op Pain Control Plan: multimodal analgesia  Post Op Pain Control Plan Comments:   Induction:   IV  Beta Blocker:         Informed Consent: Patient understands risks and agrees with Anesthesia plan.  Questions answered. Anesthesia consent signed with patient.  ASA Score: 2     Day of Surgery Review of History & Physical:    H&P update referred to the surgeon.     Anesthesia Plan Notes: Pathway orders in        Ready For Surgery From Anesthesia  Perspective.

## 2020-09-21 NOTE — DISCHARGE INSTRUCTIONS
Information to Prepare you for your Surgery    PRE-ADMIT TESTING -  [phone]    [address]  [facility]          Your surgery has been scheduled at [facility]. We are pleased to have the opportunity to serve you. For Further Information please call [phone].    On the day of surgery please report to the Information Desk on the 1st floor.    · CONTACT YOUR PHYSICIAN'S OFFICE THE DAY PRIOR TO YOUR SURGERY TO OBTAIN YOUR ARRIVAL TIME.     · The evening before surgery do not eat anything after 9 p.m. ( this includes hard candy, chewing gum and mints).  You may only have GATORADE, POWERADE AND WATER  from 9 p.m. until you leave your home.   DO NOT DRINK ANY LIQUIDS ON THE WAY TO THE HOSPITAL.      SPECIAL MEDICATION INSTRUCTIONS: TAKE medications checked off by the Anesthesiologist on your Medication List.    Angiogram Patients: Take medications as instructed by your physician, including aspirin.     Surgery Patients:    If you take ASPIRIN - Your PHYSICIAN/SURGEON will need to inform you IF/OR when you need to stop taking aspirin prior to your surgery.     Do Not take any medications containing IBUPROFEN.  Do Not Wear any make-up or dark nail polish   (especially eye make-up) to surgery. If you come to surgery with makeup on you will be required to remove the makeup or nail polish.    Do not shave your surgical area at least 5 days prior to your surgery. The surgical prep will be performed at the hospital according to Infection Control regulations.    Leave all valuables at home.   Do Not wear any jewelry or watches, including any metal in body piercings. Jewelry must be removed prior to coming to the hospital.  There is a possibility that rings that are unable to be removed may be cut off if they are on the surgical extremity.    Contact Lens must be removed before surgery. Either do not wear the contact lens or bring a case and solution for  storage.  Please bring a container for eyeglasses or dentures as required.  Bring any paperwork your physician has provided, such as consent forms,  history and physicals, doctor's orders, etc.   Bring comfortable clothes that are loose fitting to wear upon discharge. Take into consideration the type of surgery being performed.  Maintain your diet as advised per your physician the day prior to surgery.      Adequate rest the night before surgery is advised.   Park in the Parking lot behind the hospital or in the [facility] across the street from the parking lot. Parking is complimentary.  If you will be discharged the same day as your procedure, please arrange for a responsible adult to drive you home or to accompany you if traveling by taxi.   YOU WILL NOT BE PERMITTED TO DRIVE OR TO LEAVE THE HOSPITAL ALONE AFTER SURGERY.   If you are being discharged the same day, it is strongly recommended that you arrange for someone to remain with you for the first 24 hrs following your surgery.    The Surgeon will speak to your family/visitor after your surgery regarding the outcome of your surgery and post op care.  The Surgeon may speak to you after your surgery, but there is a possibility you may not remember the details.  Please check with your family members regarding the conversation with the Surgeon.    We strongly recommend whoever is bringing you home be present for discharge instructions.  This will ensure a thorough understanding for your post op home care.    ALL CHILDREN MUST ALWAYS BE ACCOMPANIED BY AN ADULT.    Visitors-Refer to current Visitor policy handouts.    Thank you for your cooperation.  The Staff of [facility].                Bathing Instructions with Hibiclens     Shower the evening before and morning of your procedure with Hibiclens:   Wash your face with water and your regular face wash/soap   Apply Hibiclens directly on your skin or on a wet washcloth and wash  gently. When showering: Move away from the shower stream when applying Hibiclens to avoid rinsing off too soon.   Rinse thoroughly with warm water   Do not dilute Hibiclens         Dry off as usual, do not use any deodorant, powder, body lotions, perfume, after shave or cologne.

## 2020-09-21 NOTE — PROGRESS NOTES
"CC: Preop exam    Elisa Gongora is a 45 y.o. female  presents for a pre-op exam for a [facility], cystoscopy scheduled on 2020.      She reports cycles are irregular and heavy. She usually has to stay at home. She uses Depends due to the heaviness. She reports clots. She is anemic and taking iron. She reports cycles are irregular. She denies dysmenorrhea. She reports painful intercourse, vaginally.      Ultrasound (under media tab), shows uterus to be approximately 12 cm, nabothian cysts on cervix and small fibroids    History reviewed. No pertinent past medical history.    Past Surgical History:   Procedure Laterality Date     SECTION      LAPAROSCOPIC CHOLECYSTECTOMY N/A 2020    Procedure: CHOLECYSTECTOMY, LAPAROSCOPIC;  Surgeon: Landon Dunne Jr., MD;  Location: [facility];  Service: General;  Laterality: N/A;       OB History    Para Term  AB Living   4 3 3     3   SAB TAB Ectopic Multiple Live Births         1 3      # Outcome Date GA Lbr Cholo/2nd Weight Sex Delivery Anes PTL Lv   4 Term      CS-LTranv   RENEE   3 Term      Vag-Spont   RENEE   2 Term      Vag-Spont   RENEE   1A          FD   1B          FD       Family History   Problem Relation Age of Onset    Cancer Mother         Lung    Breast cancer Maternal Aunt     Colon cancer Neg Hx     Ovarian cancer Neg Hx        Social History     Tobacco Use    Smoking status: Never Smoker    Smokeless tobacco: Never Used   Substance Use Topics    Alcohol use: Yes     Comment: social    Drug use: Never       /74 (BP Location: Left arm, Patient Position: Sitting, BP Method: Medium (Manual))   Ht 5' 6" (1.676 m)   Wt 97.3 kg (214 lb 8.1 oz)   LMP  (LMP Unknown)   BMI 34.62 kg/m²     Current Outpatient Medications on File Prior to Visit   Medication Sig Dispense Refill    acetaminophen (TYLENOL) 325 MG tablet Take 325 mg by mouth every 6 (six) hours as needed for Pain.      [DISCONTINUED] azithromycin " (Z-LEI) 250 MG tablet Take 1 tablet (250 mg total) by mouth once daily. Take first 2 tablets together, then 1 every day until finished. 6 tablet 0    [DISCONTINUED] diclofenac sodium (VOLTAREN) 1 % Gel Apply 2 g topically 4 (four) times daily. 1 Tube 0    [DISCONTINUED] HYDROcodone-acetaminophen (NORCO) 7.5-325 mg per tablet Take 1 tablet by mouth every 4 (four) hours as needed. 20 tablet 0    [DISCONTINUED] ibuprofen (ADVIL,MOTRIN) 600 MG tablet Take 1 tablet (600 mg total) by mouth every 6 (six) hours as needed for Pain. 20 tablet 0    [DISCONTINUED] ondansetron (ZOFRAN-ODT) 4 MG TbDL Take 1 tablet (4 mg total) by mouth every 8 (eight) hours as needed (nausea). 20 tablet 0     No current facility-administered medications on file prior to visit.         Review of patient's allergies indicates:  No Known Allergies     ROS:  GENERAL: Denies weight gain or weight loss. Feeling well overall.   SKIN: Denies rash or lesions.   HEAD: Denies head injury or headache.   NODES: Denies enlarged lymph nodes.   CHEST: Denies chest pain or shortness of breath.   CARDIOVASCULAR: Denies palpitations or left sided chest pain.   ABDOMEN: No abdominal pain, constipation, diarrhea, nausea, vomiting or rectal bleeding.   URINARY: No frequency, dysuria, hematuria, or burning on urination.  REPRODUCTIVE: See HPI.   HEMATOLOGIC: No easy bruisability or excessive bleeding with the exception of menstrual cycles.  MUSCULOSKELETAL: Denies joint pain or swelling.   NEUROLOGIC: Denies syncope or weakness.   PSYCHIATRIC: Denies depression, anxiety or mood swings.    PHYSICAL EXAM:  APPEARANCE: Well nourished, well developed, in no acute distress.  AFFECT: WNL, alert and oriented x 3  SKIN: No acne or hirsutism  NECK: Neck symmetric without masses or thyromegaly  NODES: No inguinal, cervical, axillary, or femoral lymph node enlargement  CHEST: Good respiratory effect  ABDOMEN: Soft.  No tenderness or masses.  No hepatosplenomegaly.  No  hernias.  PELVIC: Deferred  EXTREMITIES: No edema.      ICD-10-CM ICD-9-CM    1. Preoperative exam for gynecologic surgery  Z01.818 V72.83        Patient was counseled today on treatment options for fibroids including low dose oral contraceptive pills, Orhiann, DepoLupron, UFE, DaVinci assisted myomectomy, abdominal myomectomy, and hysterectomy.  Patient desires a hysterectomy. Discussed ovarian conservation versus oophorectomy. R/B discussed. Patient desires oophorectomy.     Surgical risks discussed including but not limited to risk of bleeding, infection, cystotomy due to previous , and risk of laparotomy.         I have discussed the risks, benefits, indications, and alternatives of the procedure in detail.  The patient verbalizes her understanding.  All questions answered.  Consents signed.  The patient agrees to proceed to proceed as planned.

## 2020-09-22 ENCOUNTER — HOSPITAL ENCOUNTER (OUTPATIENT)
Facility: OTHER | Age: 45
Discharge: HOME | End: 2020-09-24
Attending: OBSTETRICS & GYNECOLOGY | Admitting: OBSTETRICS & GYNECOLOGY
Payer: COMMERCIAL

## 2020-09-22 ENCOUNTER — ANESTHESIA (OUTPATIENT)
Dept: SURGERY | Facility: OTHER | Age: 45
End: 2020-09-22
Payer: COMMERCIAL

## 2020-09-22 DIAGNOSIS — D25.1 FIBROIDS, INTRAMURAL: ICD-10-CM

## 2020-09-22 LAB
B-HCG UR QL: NEGATIVE
CTP QC/QA: YES
POCT GLUCOSE: 129 MG/DL (ref 70–110)

## 2020-09-22 PROCEDURE — 36000710: Performed by: OBSTETRICS & GYNECOLOGY

## 2020-09-22 PROCEDURE — 71000033 HC RECOVERY, INTIAL HOUR: Performed by: OBSTETRICS & GYNECOLOGY

## 2020-09-22 PROCEDURE — 88307 TISSUE EXAM BY PATHOLOGIST: CPT | Mod: 26,,, | Performed by: PATHOLOGY

## 2020-09-22 PROCEDURE — 25000003 PHARM REV CODE 250: Performed by: ANESTHESIOLOGY

## 2020-09-22 PROCEDURE — 51798 US URINE CAPACITY MEASURE: CPT | Performed by: OBSTETRICS & GYNECOLOGY

## 2020-09-22 PROCEDURE — 63600175 PHARM REV CODE 636 W HCPCS: Performed by: ANESTHESIOLOGY

## 2020-09-22 PROCEDURE — G0378 HOSPITAL OBSERVATION PER HR: HCPCS

## 2020-09-22 PROCEDURE — 27201423 OPTIME MED/SURG SUP & DEVICES STERILE SUPPLY: Performed by: OBSTETRICS & GYNECOLOGY

## 2020-09-22 PROCEDURE — 58573 PR LAPAROSCOPY TOT HYSTERECTOMY UTERUS >250 GRAM W TUBE/OVARY: ICD-10-PCS | Mod: ,,, | Performed by: OBSTETRICS & GYNECOLOGY

## 2020-09-22 PROCEDURE — 99900035 HC TECH TIME PER 15 MIN (STAT)

## 2020-09-22 PROCEDURE — 37000009 HC ANESTHESIA EA ADD 15 MINS: Performed by: OBSTETRICS & GYNECOLOGY

## 2020-09-22 PROCEDURE — 71000039 HC RECOVERY, EACH ADD'L HOUR: Performed by: OBSTETRICS & GYNECOLOGY

## 2020-09-22 PROCEDURE — 36000711: Performed by: OBSTETRICS & GYNECOLOGY

## 2020-09-22 PROCEDURE — 58573 TLH W/T/O UTERUS OVER 250 G: CPT | Mod: ,,, | Performed by: OBSTETRICS & GYNECOLOGY

## 2020-09-22 PROCEDURE — 25000003 PHARM REV CODE 250: Performed by: OBSTETRICS & GYNECOLOGY

## 2020-09-22 PROCEDURE — 63600175 PHARM REV CODE 636 W HCPCS: Performed by: OBSTETRICS & GYNECOLOGY

## 2020-09-22 PROCEDURE — 63600175 PHARM REV CODE 636 W HCPCS: Performed by: NURSE ANESTHETIST, CERTIFIED REGISTERED

## 2020-09-22 PROCEDURE — 88307 TISSUE EXAM BY PATHOLOGIST: CPT | Performed by: PATHOLOGY

## 2020-09-22 PROCEDURE — 94761 N-INVAS EAR/PLS OXIMETRY MLT: CPT | Mod: 59

## 2020-09-22 PROCEDURE — 37000008 HC ANESTHESIA 1ST 15 MINUTES: Performed by: OBSTETRICS & GYNECOLOGY

## 2020-09-22 PROCEDURE — 71000016 HC POSTOP RECOV ADDL HR: Performed by: OBSTETRICS & GYNECOLOGY

## 2020-09-22 PROCEDURE — 88307 PR  SURG PATH,LEVEL V: ICD-10-PCS | Mod: 26,,, | Performed by: PATHOLOGY

## 2020-09-22 PROCEDURE — 81025 URINE PREGNANCY TEST: CPT | Performed by: OBSTETRICS & GYNECOLOGY

## 2020-09-22 PROCEDURE — 63600175 PHARM REV CODE 636 W HCPCS: Performed by: STUDENT IN AN ORGANIZED HEALTH CARE EDUCATION/TRAINING PROGRAM

## 2020-09-22 PROCEDURE — 96374 THER/PROPH/DIAG INJ IV PUSH: CPT | Mod: 59 | Performed by: OBSTETRICS & GYNECOLOGY

## 2020-09-22 PROCEDURE — 25000003 PHARM REV CODE 250: Performed by: NURSE ANESTHETIST, CERTIFIED REGISTERED

## 2020-09-22 PROCEDURE — 71000015 HC POSTOP RECOV 1ST HR: Performed by: OBSTETRICS & GYNECOLOGY

## 2020-09-22 PROCEDURE — 25000003 PHARM REV CODE 250: Performed by: STUDENT IN AN ORGANIZED HEALTH CARE EDUCATION/TRAINING PROGRAM

## 2020-09-22 PROCEDURE — 63600175 PHARM REV CODE 636 W HCPCS: Mod: JG | Performed by: NURSE ANESTHETIST, CERTIFIED REGISTERED

## 2020-09-22 PROCEDURE — P9045 ALBUMIN (HUMAN), 5%, 250 ML: HCPCS | Mod: JG | Performed by: NURSE ANESTHETIST, CERTIFIED REGISTERED

## 2020-09-22 RX ORDER — DIPHENHYDRAMINE HYDROCHLORIDE 50 MG/ML
25 INJECTION INTRAMUSCULAR; INTRAVENOUS EVERY 4 HOURS PRN
Status: DISCONTINUED | OUTPATIENT
Start: 2020-09-22 | End: 2020-09-22

## 2020-09-22 RX ORDER — BISACODYL 10 MG
10 SUPPOSITORY, RECTAL RECTAL DAILY PRN
Status: DISCONTINUED | OUTPATIENT
Start: 2020-09-22 | End: 2020-09-24 | Stop reason: HOSPADM

## 2020-09-22 RX ORDER — DEXAMETHASONE SODIUM PHOSPHATE 4 MG/ML
INJECTION, SOLUTION INTRA-ARTICULAR; INTRALESIONAL; INTRAMUSCULAR; INTRAVENOUS; SOFT TISSUE
Status: DISCONTINUED | OUTPATIENT
Start: 2020-09-22 | End: 2020-09-22

## 2020-09-22 RX ORDER — HYDROCODONE BITARTRATE AND ACETAMINOPHEN 5; 325 MG/1; MG/1
1 TABLET ORAL EVERY 6 HOURS PRN
Qty: 25 TABLET | Refills: 0 | Status: SHIPPED | OUTPATIENT
Start: 2020-09-22

## 2020-09-22 RX ORDER — SIMETHICONE 80 MG
1 TABLET,CHEWABLE ORAL 3 TIMES DAILY PRN
Status: DISCONTINUED | OUTPATIENT
Start: 2020-09-22 | End: 2020-09-24 | Stop reason: HOSPADM

## 2020-09-22 RX ORDER — ACETAMINOPHEN 500 MG
1000 TABLET ORAL
Status: COMPLETED | OUTPATIENT
Start: 2020-09-22 | End: 2020-09-22

## 2020-09-22 RX ORDER — NEOSTIGMINE METHYLSULFATE 1 MG/ML
INJECTION, SOLUTION INTRAVENOUS
Status: DISCONTINUED | OUTPATIENT
Start: 2020-09-22 | End: 2020-09-22

## 2020-09-22 RX ORDER — MUPIROCIN 20 MG/G
OINTMENT TOPICAL 2 TIMES DAILY
Status: DISCONTINUED | OUTPATIENT
Start: 2020-09-22 | End: 2020-09-24 | Stop reason: HOSPADM

## 2020-09-22 RX ORDER — SODIUM CHLORIDE, SODIUM LACTATE, POTASSIUM CHLORIDE, CALCIUM CHLORIDE 600; 310; 30; 20 MG/100ML; MG/100ML; MG/100ML; MG/100ML
INJECTION, SOLUTION INTRAVENOUS CONTINUOUS
Status: DISCONTINUED | OUTPATIENT
Start: 2020-09-22 | End: 2020-09-23

## 2020-09-22 RX ORDER — DIPHENHYDRAMINE HCL 25 MG
25 CAPSULE ORAL EVERY 4 HOURS PRN
Status: DISCONTINUED | OUTPATIENT
Start: 2020-09-22 | End: 2020-09-22

## 2020-09-22 RX ORDER — CELECOXIB 200 MG/1
400 CAPSULE ORAL
Status: COMPLETED | OUTPATIENT
Start: 2020-09-22 | End: 2020-09-22

## 2020-09-22 RX ORDER — DIPHENHYDRAMINE HYDROCHLORIDE 50 MG/ML
25 INJECTION INTRAMUSCULAR; INTRAVENOUS EVERY 4 HOURS PRN
Status: DISCONTINUED | OUTPATIENT
Start: 2020-09-22 | End: 2020-09-24 | Stop reason: HOSPADM

## 2020-09-22 RX ORDER — PROPOFOL 10 MG/ML
VIAL (ML) INTRAVENOUS
Status: DISCONTINUED | OUTPATIENT
Start: 2020-09-22 | End: 2020-09-22

## 2020-09-22 RX ORDER — HYDROMORPHONE HYDROCHLORIDE 2 MG/ML
0.2 INJECTION, SOLUTION INTRAMUSCULAR; INTRAVENOUS; SUBCUTANEOUS EVERY 5 MIN PRN
Status: DISCONTINUED | OUTPATIENT
Start: 2020-09-22 | End: 2020-09-22 | Stop reason: HOSPADM

## 2020-09-22 RX ORDER — DIPHENHYDRAMINE HCL 25 MG
25 CAPSULE ORAL EVERY 4 HOURS PRN
Status: DISCONTINUED | OUTPATIENT
Start: 2020-09-22 | End: 2020-09-24 | Stop reason: HOSPADM

## 2020-09-22 RX ORDER — PREGABALIN 50 MG/1
50 CAPSULE ORAL
Status: COMPLETED | OUTPATIENT
Start: 2020-09-22 | End: 2020-09-22

## 2020-09-22 RX ORDER — OXYCODONE AND ACETAMINOPHEN 10; 325 MG/1; MG/1
1 TABLET ORAL EVERY 4 HOURS PRN
Status: DISCONTINUED | OUTPATIENT
Start: 2020-09-22 | End: 2020-09-24 | Stop reason: HOSPADM

## 2020-09-22 RX ORDER — KETOROLAC TROMETHAMINE 30 MG/ML
30 INJECTION, SOLUTION INTRAMUSCULAR; INTRAVENOUS EVERY 8 HOURS
Status: COMPLETED | OUTPATIENT
Start: 2020-09-22 | End: 2020-09-23

## 2020-09-22 RX ORDER — SODIUM CHLORIDE, SODIUM LACTATE, POTASSIUM CHLORIDE, CALCIUM CHLORIDE 600; 310; 30; 20 MG/100ML; MG/100ML; MG/100ML; MG/100ML
INJECTION, SOLUTION INTRAVENOUS CONTINUOUS
Status: DISCONTINUED | OUTPATIENT
Start: 2020-09-22 | End: 2020-09-24

## 2020-09-22 RX ORDER — CEFAZOLIN SODIUM 2 G/50ML
2 SOLUTION INTRAVENOUS
Status: COMPLETED | OUTPATIENT
Start: 2020-09-22 | End: 2020-09-22

## 2020-09-22 RX ORDER — MUPIROCIN 20 MG/G
OINTMENT TOPICAL
Status: COMPLETED | OUTPATIENT
Start: 2020-09-22 | End: 2020-09-22

## 2020-09-22 RX ORDER — IBUPROFEN 600 MG/1
600 TABLET ORAL EVERY 6 HOURS
Status: DISCONTINUED | OUTPATIENT
Start: 2020-09-23 | End: 2020-09-24 | Stop reason: HOSPADM

## 2020-09-22 RX ORDER — SODIUM CHLORIDE 0.9 % (FLUSH) 0.9 %
3 SYRINGE (ML) INJECTION
Status: DISCONTINUED | OUTPATIENT
Start: 2020-09-22 | End: 2020-09-22 | Stop reason: HOSPADM

## 2020-09-22 RX ORDER — ONDANSETRON 8 MG/1
8 TABLET, ORALLY DISINTEGRATING ORAL EVERY 8 HOURS PRN
Status: DISCONTINUED | OUTPATIENT
Start: 2020-09-22 | End: 2020-09-22 | Stop reason: SDUPTHER

## 2020-09-22 RX ORDER — ONDANSETRON 8 MG/1
8 TABLET, ORALLY DISINTEGRATING ORAL EVERY 8 HOURS PRN
Status: DISCONTINUED | OUTPATIENT
Start: 2020-09-22 | End: 2020-09-24 | Stop reason: HOSPADM

## 2020-09-22 RX ORDER — LIDOCAINE HCL/PF 100 MG/5ML
SYRINGE (ML) INTRAVENOUS
Status: DISCONTINUED | OUTPATIENT
Start: 2020-09-22 | End: 2020-09-22

## 2020-09-22 RX ORDER — ACETAMINOPHEN 325 MG/1
650 TABLET ORAL EVERY 4 HOURS PRN
Status: DISCONTINUED | OUTPATIENT
Start: 2020-09-22 | End: 2020-09-24 | Stop reason: HOSPADM

## 2020-09-22 RX ORDER — FENTANYL CITRATE 50 UG/ML
INJECTION, SOLUTION INTRAMUSCULAR; INTRAVENOUS
Status: DISCONTINUED | OUTPATIENT
Start: 2020-09-22 | End: 2020-09-22

## 2020-09-22 RX ORDER — ONDANSETRON 2 MG/ML
4 INJECTION INTRAMUSCULAR; INTRAVENOUS DAILY PRN
Status: DISCONTINUED | OUTPATIENT
Start: 2020-09-22 | End: 2020-09-22 | Stop reason: HOSPADM

## 2020-09-22 RX ORDER — LIDOCAINE HYDROCHLORIDE 10 MG/ML
0.5 INJECTION, SOLUTION EPIDURAL; INFILTRATION; INTRACAUDAL; PERINEURAL
Status: DISCONTINUED | OUTPATIENT
Start: 2020-09-22 | End: 2020-09-22 | Stop reason: HOSPADM

## 2020-09-22 RX ORDER — IBUPROFEN 800 MG/1
800 TABLET ORAL 3 TIMES DAILY
Qty: 60 TABLET | Refills: 0 | Status: SHIPPED | OUTPATIENT
Start: 2020-09-22

## 2020-09-22 RX ORDER — PHENYLEPHRINE HYDROCHLORIDE 10 MG/ML
INJECTION INTRAVENOUS
Status: DISCONTINUED | OUTPATIENT
Start: 2020-09-22 | End: 2020-09-22

## 2020-09-22 RX ORDER — OXYCODONE HYDROCHLORIDE 5 MG/1
5 TABLET ORAL
Status: DISCONTINUED | OUTPATIENT
Start: 2020-09-22 | End: 2020-09-22 | Stop reason: HOSPADM

## 2020-09-22 RX ORDER — ALBUMIN HUMAN 50 G/1000ML
SOLUTION INTRAVENOUS CONTINUOUS PRN
Status: DISCONTINUED | OUTPATIENT
Start: 2020-09-22 | End: 2020-09-22

## 2020-09-22 RX ORDER — MEPERIDINE HYDROCHLORIDE 25 MG/ML
12.5 INJECTION INTRAMUSCULAR; INTRAVENOUS; SUBCUTANEOUS ONCE AS NEEDED
Status: DISCONTINUED | OUTPATIENT
Start: 2020-09-22 | End: 2020-09-22 | Stop reason: HOSPADM

## 2020-09-22 RX ORDER — BUPIVACAINE HYDROCHLORIDE 2.5 MG/ML
INJECTION, SOLUTION EPIDURAL; INFILTRATION; INTRACAUDAL
Status: DISCONTINUED | OUTPATIENT
Start: 2020-09-22 | End: 2020-09-22 | Stop reason: HOSPADM

## 2020-09-22 RX ORDER — OXYCODONE AND ACETAMINOPHEN 5; 325 MG/1; MG/1
1 TABLET ORAL EVERY 4 HOURS PRN
Status: DISCONTINUED | OUTPATIENT
Start: 2020-09-22 | End: 2020-09-24 | Stop reason: HOSPADM

## 2020-09-22 RX ORDER — FAMOTIDINE 20 MG/1
20 TABLET, FILM COATED ORAL
Status: COMPLETED | OUTPATIENT
Start: 2020-09-22 | End: 2020-09-22

## 2020-09-22 RX ORDER — ONDANSETRON HYDROCHLORIDE 2 MG/ML
INJECTION, SOLUTION INTRAMUSCULAR; INTRAVENOUS
Status: DISCONTINUED | OUTPATIENT
Start: 2020-09-22 | End: 2020-09-22

## 2020-09-22 RX ORDER — ROCURONIUM BROMIDE 10 MG/ML
INJECTION, SOLUTION INTRAVENOUS
Status: DISCONTINUED | OUTPATIENT
Start: 2020-09-22 | End: 2020-09-22

## 2020-09-22 RX ADMIN — DEXAMETHASONE SODIUM PHOSPHATE 8 MG: 4 INJECTION, SOLUTION INTRAMUSCULAR; INTRAVENOUS at 10:09

## 2020-09-22 RX ADMIN — OXYCODONE 5 MG: 5 TABLET ORAL at 01:09

## 2020-09-22 RX ADMIN — PHENYLEPHRINE HYDROCHLORIDE 100 MCG: 10 INJECTION INTRAVENOUS at 11:09

## 2020-09-22 RX ADMIN — IBUPROFEN 800 MG: 800 INJECTION INTRAVENOUS at 02:09

## 2020-09-22 RX ADMIN — OXYCODONE 5 MG: 5 TABLET ORAL at 08:09

## 2020-09-22 RX ADMIN — PREGABALIN 50 MG: 50 CAPSULE ORAL at 07:09

## 2020-09-22 RX ADMIN — FAMOTIDINE 20 MG: 20 TABLET ORAL at 07:09

## 2020-09-22 RX ADMIN — GLYCOPYRROLATE 0.2 MG: 0.2 INJECTION, SOLUTION INTRAMUSCULAR; INTRAVITREAL at 10:09

## 2020-09-22 RX ADMIN — HYDROMORPHONE HYDROCHLORIDE 0.2 MG: 2 INJECTION, SOLUTION INTRAMUSCULAR; INTRAVENOUS; SUBCUTANEOUS at 01:09

## 2020-09-22 RX ADMIN — ALBUMIN (HUMAN): 12.5 SOLUTION INTRAVENOUS at 11:09

## 2020-09-22 RX ADMIN — ACETAMINOPHEN 1000 MG: 500 TABLET, FILM COATED ORAL at 07:09

## 2020-09-22 RX ADMIN — GLYCOPYRROLATE 0.6 MG: 0.2 INJECTION, SOLUTION INTRAMUSCULAR; INTRAVITREAL at 12:09

## 2020-09-22 RX ADMIN — NEOSTIGMINE METHYLSULFATE 5 MG: 1 INJECTION INTRAVENOUS at 12:09

## 2020-09-22 RX ADMIN — KETOROLAC TROMETHAMINE 30 MG: 30 INJECTION, SOLUTION INTRAMUSCULAR at 10:09

## 2020-09-22 RX ADMIN — FENTANYL CITRATE 50 MCG: 50 INJECTION, SOLUTION INTRAMUSCULAR; INTRAVENOUS at 01:09

## 2020-09-22 RX ADMIN — MUPIROCIN: 20 OINTMENT TOPICAL at 10:09

## 2020-09-22 RX ADMIN — LIDOCAINE HYDROCHLORIDE 60 MG: 20 INJECTION, SOLUTION INTRAVENOUS at 10:09

## 2020-09-22 RX ADMIN — PROMETHAZINE HYDROCHLORIDE 6.25 MG: 25 INJECTION INTRAMUSCULAR; INTRAVENOUS at 06:09

## 2020-09-22 RX ADMIN — FENTANYL CITRATE 50 MCG: 50 INJECTION, SOLUTION INTRAMUSCULAR; INTRAVENOUS at 10:09

## 2020-09-22 RX ADMIN — ROCURONIUM BROMIDE 50 MG: 10 INJECTION, SOLUTION INTRAVENOUS at 10:09

## 2020-09-22 RX ADMIN — CELECOXIB 400 MG: 200 CAPSULE ORAL at 07:09

## 2020-09-22 RX ADMIN — SODIUM CHLORIDE, SODIUM LACTATE, POTASSIUM CHLORIDE, AND CALCIUM CHLORIDE: 600; 310; 30; 20 INJECTION, SOLUTION INTRAVENOUS at 12:09

## 2020-09-22 RX ADMIN — SIMETHICONE 80 MG: 80 TABLET, CHEWABLE ORAL at 10:09

## 2020-09-22 RX ADMIN — ONDANSETRON 8 MG: 8 TABLET, ORALLY DISINTEGRATING ORAL at 10:09

## 2020-09-22 RX ADMIN — CEFAZOLIN SODIUM 2 G: 2 SOLUTION INTRAVENOUS at 10:09

## 2020-09-22 RX ADMIN — SODIUM CHLORIDE, SODIUM LACTATE, POTASSIUM CHLORIDE, AND CALCIUM CHLORIDE: 600; 310; 30; 20 INJECTION, SOLUTION INTRAVENOUS at 08:09

## 2020-09-22 RX ADMIN — ONDANSETRON 4 MG: 2 INJECTION, SOLUTION INTRAMUSCULAR; INTRAVENOUS at 10:09

## 2020-09-22 RX ADMIN — SODIUM CHLORIDE, SODIUM LACTATE, POTASSIUM CHLORIDE, AND CALCIUM CHLORIDE: .6; .31; .03; .02 INJECTION, SOLUTION INTRAVENOUS at 10:09

## 2020-09-22 RX ADMIN — MUPIROCIN: 20 OINTMENT TOPICAL at 07:09

## 2020-09-22 RX ADMIN — PROPOFOL 200 MG: 10 INJECTION, EMULSION INTRAVENOUS at 10:09

## 2020-09-22 NOTE — ANESTHESIA POSTPROCEDURE EVALUATION
Anesthesia Post Evaluation    Patient: Elisa Gongora    Procedure(s) Performed: Procedure(s) (LRB):  HYSTERECTOMY, TOTAL, LAPAROSCOPIC with bilateral salpingectomy and oopherectomy (N/A)  CYSTOSCOPY (N/A)    Final Anesthesia Type: general    Patient location during evaluation: PACU  Patient participation: Yes- Able to Participate  Level of consciousness: awake and alert  Post-procedure vital signs: reviewed and stable  Pain management: adequate  Airway patency: patent    PONV status at discharge: No PONV  Anesthetic complications: no      Cardiovascular status: blood pressure returned to baseline  Respiratory status: unassisted, spontaneous ventilation and room air  Hydration status: euvolemic  Follow-up not needed.          Vitals Value Taken Time   /69 09/22/20 1423   Temp 36.6 °C (97.9 °F) 09/22/20 1415   Pulse 54 09/22/20 1423   Resp 18 09/22/20 1415   SpO2 94 % 09/22/20 1423   Vitals shown include unvalidated device data.      Event Time   Out of Recovery 14:24:25         Pain/Benny Score: Pain Rating Prior to Med Admin: 7 (9/22/2020  1:41 PM)  Pain Rating Post Med Admin: 4 (9/22/2020  2:10 PM)  Benny Score: 9 (9/22/2020  2:10 PM)

## 2020-09-22 NOTE — BRIEF OP NOTE
BRIEF OPERATIVE NOTE       SUMMARY      Surgery Date: 09/22/2020     SURGEON: Cyndie Hameed    ASSISTANT: Dr. Saundra Travis    PREOP DIAGNOSIS:   1. Fibroids  2. Menorrhagia  3. Morbid obesity (BMI 34)    POSTOP DIAGNOSIS:    1. Fibroids  2. Menorrhagia  3. Morbid obesity (BMI 34)    PROCEDURES:   1. Total Laparoscopic Hysterectomy with BSO  2. Cystoscopy    ANESTHESIA: general    FINDINGS/KEY COMPONENTS: enlarged uterus with multiple fibroids distorting normal anatomy. Normal tubes and ovaries. Both ureters effluxing urine briskly bilaterally. Bladder intact on cystoscopy.     ESTIMATED BLOOD LOSS: 600 cc    COMPLICATIONS: None    PATHOLOGY:   Specimens (From admission, onward)    None

## 2020-09-22 NOTE — TRANSFER OF CARE
"Anesthesia Transfer of Care Note    Patient: Elisa Gongora    Procedure(s) Performed: Procedure(s) (LRB):  HYSTERECTOMY, TOTAL, LAPAROSCOPIC with bilateral salpingectomy and oopherectomy (N/A)  CYSTOSCOPY (N/A)    Patient location: PACU    Anesthesia Type: general    Transport from OR: Transported from OR on 2-3 L/min O2 by NC with adequate spontaneous ventilation. Continuous SpO2 monitoring in transport    Post pain: adequate analgesia    Post assessment: no apparent anesthetic complications and tolerated procedure well    Post vital signs: stable    Level of consciousness: awake and alert    Nausea/Vomiting: no nausea/vomiting    Complications: none    Transfer of care protocol was followed      Last vitals:   Visit Vitals  /62 (BP Location: Right arm, Patient Position: Lying)   Pulse 75   Temp 36.8 °C (98.3 °F) (Oral)   Resp 16   Ht 5' 6" (1.676 m)   Wt 95.3 kg (210 lb)   LMP 09/22/2020   SpO2 100%   Breastfeeding No   BMI 33.89 kg/m²     "

## 2020-09-22 NOTE — OP NOTE
Surgery Date: 09/22/2020     SURGEON: Cyndie Hameed    ASSISTANT: Dr. Saundra Travis    PREOP DIAGNOSIS:   1. Fibroids  2. Menorrhagia  3. Morbid obesity (BMI 34)    POSTOP DIAGNOSIS:    1. Fibroids  2. Menorrhagia  3. Morbid obesity (BMI 34)    PROCEDURES:   1. Total Laparoscopic Hysterectomy with BSO  2. Cystoscopy    ANESTHESIA: general    FINDINGS/KEY COMPONENTS: enlarged uterus with multiple fibroids distorting normal anatomy. Normal tubes and ovaries. Both ureters effluxing urine briskly bilaterally. Bladder intact on cystoscopy.     ESTIMATED BLOOD LOSS: 600 cc    COMPLICATIONS: None    IV FLUIDS: 1500 cc    URINE OUTPUT: 565 cc    PROCEDURE: Patient was taking to the operating room where general anesthesia was administered and found to be adequate.  She was prepped and draped in the dorsal lithotomy position.  A weighted sterile speculum was placed in the vagina.  The anterior lip of the cervix was grasped with a single tooth tenaculum.  The uterus was sounded to approximately 12 cm.  A stay suture of 0-Vicryl was placed at 12 o'clock and 6 o'clock on the cervix.  A RAFAEL manipulator was placed inside the endometrial cavity.      Gloves were changed.  A Veress needle was inserted into the umbilicus under tenting of the anterior abdominal wall.  Placement into the peritoneal cavity was confirmed via saline drop test.  The abdomen was insufflated to 15mm Hg using Carbon dioxide.  A 10 mm supraumbilical skin incision was made with the scalpel.  A 10 mm Optiview trocar was advanced through this incision.  Excellent hemostasis was noted.  The patient was placed in deep Trendelenburg.  An 5 mm left lateral skin incision was made with a scalpel and a 5 mm trocar was advanced through this incision under visualization of the camera.  A 5 mm right lateral skin incision was made with the scalpel.  A 5 mm trocar was advanced through this incision under visualization of the camera.  Excellent hemostasis was noted.       Because of the distortion of normal anatomy, decision was made to use the 45 degree laparoscope. Attention was turned to the left round ligament.  This was cauterized and transected and continued anteriorly to create the bladder flap to the midline, which was done with some difficulty due to scarring of the bladder to the cervix. .  Attention was turned to the left IP ligament. This was cauterized and transected and carried down to the level of the uterine vessels.  The vessels were cauterized but not transected.  Attention was turned to the right round ligament.  It was cauterized and transected and continued anteriorly to finish creating the bladder flap.  Attention was turned to the right IP ligament.  This was cauterized and transected and carried down to the level of the uterine vessels.  The vessels were cauterized but not transected.  Attention was turned to the anterior portion of the cervix.  The bladder was dissected off the cervix.  Attention was turned to the posterior portion of the uterus.  The posterior colpotomy was created and carried around to the level of the uterine vessels bilaterally.  The left uterine vessels were again cauterized and transected.  The posterior colpotomy was continued anteriorly.  The right uterine vessels were cauterized and transected.  The anterior and posterior colpotomies were connected.        The uterus was removed vaginally.  A moist laparotomy sponge inside of glove was placed in the vagina to maintain intraperitoneal pressure.  The vaginal cuff was reapproximated in an interrupted fashion using 0-Vicryl suture using the Endostitch.  The pelvis was copiously irrigated and suctioned.  Excellent hemostasis was noted.      The lap in glove were removed.  The vaginal cuff was palpated and found to be intact.  The manley catheter was removed.  A cystoscope was advanced through the urethra.  The bladder was inspected and found to be intact.  Both ureters were seen  effluxing urine.  The bladder was drained and the cystoscope was removed.    Attention was turned to the anterior abdominal wall. The abdomen was deflated and all trocars were removed.  The skin was closed with 4-0 Monocryl in a subcuticular fashion.  The incisions were injected with .25% Marcaine.  Sponge, lap, and needle counts were correct x 2.  The patient was taken to the recovery room in stable condition.

## 2020-09-22 NOTE — ANESTHESIA PROCEDURE NOTES
Intubation  Performed by: Arsh Lucio Jr., CRNA  Authorized by: Joseph Salazar MD     Intubation:     Induction:  Intravenous    Intubated:  Postinduction    Mask Ventilation:  Easy mask    Attempts:  1    Attempted By:  CRNA    Method of Intubation:  Video laryngoscopy    Blade:  Berman 3    Laryngeal View Grade: Grade I - full view of chords      Difficult Airway Encountered?: No      Complications:  None    Airway Device:  Oral endotracheal tube    Airway Device Size:  7.5    Style/Cuff Inflation:  Cuffed    Tube secured:  22    Secured at:  The lips    Placement Verified By:  Capnometry    Complicating Factors:  None    Findings Post-Intubation:  BS equal bilateral and atraumatic/condition of teeth unchanged

## 2020-09-22 NOTE — DISCHARGE INSTRUCTIONS
Discharge Instructions for Laparoscopic Hysterectomy  You had a procedure called laparoscopic hysterectomy. A surgeon removed your uterus using instruments inserted through small incisions in your abdomen. These incisions may be tender or sore. You may also have pain in your upper back or shoulders. This is from the gas used to enlarge your abdomen to allow your doctor to see inside your pelvis and perform the procedure. This pain usually goes away in a day or two. It usually takes from 1-4 weeks to recover from laparoscopic hysterectomy. Remember, though, that recovery time varies from woman to woman. Here's what you can do to speed your recovery following surgery.    Home Care   · Continue the coughing and deep breathing exercises that you learned in the hospital.  · Take your medications exactly as directed by your doctor.  · Avoid constipation.  ¨ Eat fruits, vegetables, and whole grains.  ¨ Drink 6-8 glasses of water a day, unless told to do otherwise.  ¨ Use a laxative or a mild stool softener if your doctor says it's okay.  · Shower as usual in 24 hours. Wash your incisions with mild soap and water. Pat dry.  Avoid baths, swimming pools and hot tubs until  seen by your physician for a post-op follow up.  · Don't use oils, powders, or lotions on your incisions.  · You should not have any sexual activity for six weeks.  This can cause severe bleeding. You should not insert anything into the vagina for six weeks, no tampons or douching.  · If you had both ovaries removed, report hot flashes, mood swings, and irritability to your doctor. There may be medications that can help you.    Activity  · Ask your doctor when you can start driving again. It's usually okay to drive as soon as you are free of pain and able to move comfortably from side to side. Don't drive while you are still taking narcotic pain medications.  · Ask others to help with chores and errands while you recover.  · Dont lift anything heavier  than 10 pounds for 4 weeks.  · Dont vacuum or do other strenuous activities until the doctor says it's okay.  · Walk as often as you feel able.  · Climb stairs slowly and pause after every few steps.    Follow-Up  Make a follow-up appointment as directed by our staff.     When to Call Your Doctor  Call your doctor right away if you have any of the following:  · Fever above 100.4°F (38°C) or chills  · Bright red vaginal bleeding or vaginal bleeding that soaks more than one sanitary pad per hour for 2 consecutive hours  · A foul smelling discharge from the vagina  · Trouble urinating or burning when you urinate  · Severe pain or bloating in your abdomen  · Redness, swelling, or drainage at your incision sites  · Shortness of breath or chest pain   © 4425-6939 [employer], [address] [place] [address]. All rights reserved. This information is not intended as a substitute for professional medical care. Always follow your healthcare professional's instructions.      Discharge Instructions: After Your Surgery  Youve just had surgery. During surgery, you were given medicine called anesthesia to keep you relaxed and free of pain. After surgery, you may have some pain or nausea. This is common. Here are some tips for feeling better and getting well after surgery.     Stay on schedule with your medicine.   Going home  Your healthcare provider will show you how to take care of yourself when you go home. He or she will also answer your questions. Have an adult family member or friend drive you home. For the first 24 hours after your surgery:  · Do not drive or use heavy equipment.  · Do not make important decisions or sign legal papers.  · Do not drink alcohol.  · Have someone stay with you, if needed. He or she can watch for problems and help keep you safe.  Be sure to go to all follow-up visits with your healthcare provider. And rest after your surgery for as long as your healthcare provider tells you  to.  Coping with pain  If you have pain after surgery, pain medicine will help you feel better. Take it as told, before pain becomes severe. Also, ask your healthcare provider or pharmacist about other ways to control pain. This might be with heat, ice, or relaxation. And follow any other instructions your surgeon or nurse gives you.  Tips for taking pain medicine  To get the best relief possible, remember these points:  · Pain medicines can upset your stomach. Taking them with a little food may help.  · Most pain relievers taken by mouth need at least 20 to 30 minutes to start to work.  · Taking medicine on a schedule can help you remember to take it. Try to time your medicine so that you can take it before starting an activity. This might be before you get dressed, go for a walk, or sit down for dinner.  · Constipation is a common side effect of pain medicines. Call your healthcare provider before taking any medicines such as laxatives or stool softeners to help ease constipation. Also ask if you should skip any foods. Drinking lots of fluids and eating foods such as fruits and vegetables that are high in fiber can also help. Remember, do not take laxatives unless your surgeon has prescribed them.  · Drinking alcohol and taking pain medicine can cause dizziness and slow your breathing. It can even be deadly. Do not drink alcohol while taking pain medicine.  · Pain medicine can make you react more slowly to things. Do not drive or run machinery while taking pain medicine.  Your healthcare provider may tell you to take acetaminophen to help ease your pain. Ask him or her how much you are supposed to take each day. Acetaminophen or other pain relievers may interact with your prescription medicines or other over-the-counter (OTC) medicines. Some prescription medicines have acetaminophen and other ingredients. Using both prescription and OTC acetaminophen for pain can cause you to overdose. Read the labels on your OTC  medicines with care. This will help you to clearly know the list of ingredients, how much to take, and any warnings. It may also help you not take too much acetaminophen. If you have questions or do not understand the information, ask your pharmacist or healthcare provider to explain it to you before you take the OTC medicine.  Managing nausea  Some people have an upset stomach after surgery. This is often because of anesthesia, pain, or pain medicine, or the stress of surgery. These tips will help you handle nausea and eat healthy foods as you get better. If you were on a special food plan before surgery, ask your healthcare provider if you should follow it while you get better. These tips may help:  · Do not push yourself to eat. Your body will tell you when to eat and how much.  · Start off with clear liquids and soup. They are easier to digest.  · Next try semi-solid foods, such as mashed potatoes, applesauce, and gelatin, as you feel ready.  · Slowly move to solid foods. Dont eat fatty, rich, or spicy foods at first.  · Do not force yourself to have 3 large meals a day. Instead eat smaller amounts more often.  · Take pain medicines with a small amount of solid food, such as crackers or toast, to avoid nausea.     Call your surgeon if  · You still have pain an hour after taking medicine. The medicine may not be strong enough.  · You feel too sleepy, dizzy, or groggy. The medicine may be too strong.  · You have side effects like nausea, vomiting, or skin changes, such as rash, itching, or hives.       If you have obstructive sleep apnea  You were given anesthesia medicine during surgery to keep you comfortable and free of pain. After surgery, you may have more apnea spells because of this medicine and other medicines you were given. The spells may last longer than usual.   At home:  · Keep using the continuous positive airway pressure (CPAP) device when you sleep. Unless your healthcare provider tells you not  to, use it when you sleep, day or night. CPAP is a common device used to treat obstructive sleep apnea.  · Talk with your provider before taking any pain medicine, muscle relaxants, or sedatives. Your provider will tell you about the possible dangers of taking these medicines.  Date Last Reviewed: 12/1/2016  © 8316-7418 [employer]. [address] [place] [address]. All rights reserved. This information is not intended as a substitute for professional medical care. Always follow your healthcare professional's instructions.      FOLLOW ANY OTHER INSTRUCTIONS GIVEN TO YOU BY DR CAMPOS

## 2020-09-23 PROBLEM — R33.9 URINARY RETENTION: Status: ACTIVE | Noted: 2020-09-23

## 2020-09-23 PROCEDURE — G0378 HOSPITAL OBSERVATION PER HR: HCPCS

## 2020-09-23 PROCEDURE — 51701 INSERT BLADDER CATHETER: CPT

## 2020-09-23 PROCEDURE — 51702 INSERT TEMP BLADDER CATH: CPT

## 2020-09-23 PROCEDURE — 25000003 PHARM REV CODE 250: Performed by: STUDENT IN AN ORGANIZED HEALTH CARE EDUCATION/TRAINING PROGRAM

## 2020-09-23 PROCEDURE — 96376 TX/PRO/DX INJ SAME DRUG ADON: CPT | Mod: 59 | Performed by: OBSTETRICS & GYNECOLOGY

## 2020-09-23 PROCEDURE — 99024 POSTOP FOLLOW-UP VISIT: CPT | Mod: ,,, | Performed by: OBSTETRICS & GYNECOLOGY

## 2020-09-23 PROCEDURE — 99024 PR POST-OP FOLLOW-UP VISIT: ICD-10-PCS | Mod: ,,, | Performed by: OBSTETRICS & GYNECOLOGY

## 2020-09-23 PROCEDURE — 63600175 PHARM REV CODE 636 W HCPCS: Performed by: STUDENT IN AN ORGANIZED HEALTH CARE EDUCATION/TRAINING PROGRAM

## 2020-09-23 PROCEDURE — 94761 N-INVAS EAR/PLS OXIMETRY MLT: CPT

## 2020-09-23 PROCEDURE — 51798 US URINE CAPACITY MEASURE: CPT

## 2020-09-23 RX ADMIN — KETOROLAC TROMETHAMINE 30 MG: 30 INJECTION, SOLUTION INTRAMUSCULAR at 06:09

## 2020-09-23 RX ADMIN — OXYCODONE HYDROCHLORIDE AND ACETAMINOPHEN 1 TABLET: 5; 325 TABLET ORAL at 11:09

## 2020-09-23 RX ADMIN — OXYCODONE HYDROCHLORIDE AND ACETAMINOPHEN 1 TABLET: 5; 325 TABLET ORAL at 10:09

## 2020-09-23 RX ADMIN — IBUPROFEN 600 MG: 600 TABLET, FILM COATED ORAL at 11:09

## 2020-09-23 RX ADMIN — MUPIROCIN: 20 OINTMENT TOPICAL at 08:09

## 2020-09-23 RX ADMIN — OXYCODONE HYDROCHLORIDE AND ACETAMINOPHEN 1 TABLET: 5; 325 TABLET ORAL at 06:09

## 2020-09-23 RX ADMIN — SIMETHICONE 80 MG: 80 TABLET, CHEWABLE ORAL at 11:09

## 2020-09-23 RX ADMIN — OXYCODONE AND ACETAMINOPHEN 1 TABLET: 10; 325 TABLET ORAL at 12:09

## 2020-09-23 NOTE — ASSESSMENT & PLAN NOTE
- S/P TLH/BSO  - Standard post-operative care  - VSS  - UOP - Adequate    - Tolerating regular diet  - D/C IVF  - Toradol > Ibuprofen, Percocet 5/10, Dilaudid for BTP  - Encourage ambulation  - SCDs for VTE Prophylaxis  - Encoourage I.S. Use  - Passive Void trial this AM  - Anticipate discharge after conclusion of void trial.

## 2020-09-23 NOTE — HOSPITAL COURSE
09/22/2020 - Patient underwent TLH/BSO for SUF/AUB  09/23/2020 - Appropriate post-op advances apart from urinary retention following passive void trial  09/24/2020 - NAEON. Patient was able to void spontaneously without any clinically significant urinary retention. Patient meeting all post-operative goals and will be discharged in good condition.  All appropriate education, precautions and follow-up were provided prior to discharge.

## 2020-09-23 NOTE — PLAN OF CARE
SW met with pt at bedside to complete discharge assessment.  PCP at [facility] and uses [facility] on [address] and would like bedside delivery.  No POA or LW.  Spouse will provide transportation home.   No needs identified at this time.     09/23/20 1316   Discharge Assessment   Assessment Type Discharge Planning Assessment   Confirmed/corrected address and phone number on facesheet? Yes   Assessment information obtained from? Patient   Communicated expected length of stay with patient/caregiver no   Prior to hospitilization cognitive status: Alert/Oriented   Prior to hospitalization functional status: Independent   Current cognitive status: Alert/Oriented   Current Functional Status: Independent   Lives With spouse   Able to Return to Prior Arrangements yes   Is patient able to care for self after discharge? Yes   Readmission Within the Last 30 Days no previous admission in last 30 days   Patient currently being followed by outpatient case management? No   Patient currently receives any other outside agency services? No   Equipment Currently Used at Home none   Do you have any problems affording any of your prescribed medications? No   Is the patient taking medications as prescribed? yes   Does the patient have transportation home? Yes   Transportation Anticipated family or friend will provide   Does the patient receive services at the [facility]? No   Discharge Plan A Home   DME Needed Upon Discharge  none   Patient/Family in Agreement with Plan yes

## 2020-09-23 NOTE — OR NURSING
GYN resident notified that patient voided 150ml and bladder scan showed 900ml.  Hayes catheter ordered.

## 2020-09-23 NOTE — PLAN OF CARE
4:02 PM  Resting comfortably in bed at this time.  VSS on RA and afebrile this shift.  Tolerating pain on PO.  Lap sites x4 CDI with bandaids and steristrips s drainage to peripad.  Good appetite and Indwelling manley re-inserted this shift RT Urine Retention > 4 hours. Adequate UO p insertion, Fluid intake encouraged  Repositions self independently in bed and ambulating around hallway c mask on       Free from injury or skin breakdown; Fall precautions maintained and call light in reach.  POC updated questions answered and comments acknowledged.    Hopeful for DC in AM p Passing a Passive Void trial.

## 2020-09-23 NOTE — NURSING
Pt arrived via stretcher. Pt oriented to room. Pt AAOx4. Bed locked and in lowest position. Non skid socks on. Bed alarm on. SCDs placed. IVF initiated. Pt instructed on how to use call bell. Call light w/i reach. White board updated. Will continue to monitor.

## 2020-09-23 NOTE — SUBJECTIVE & OBJECTIVE
Interval History: Patient states that she's doing well this AM. Patient stayed overnight after surgery after she was unable to void spontaneously post-op. After a manley was replaced 1000 cc was evacuated from the bladder. She states that her pain is controlled. She is tolerating a diet and is passing flatus. She was ambulating prior to having her manley replaced overnight.     Scheduled Meds:   ibuprofen  600 mg Oral Q6H    ketorolac  30 mg Intravenous Q8H    mupirocin   Nasal BID     Continuous Infusions:   lactated ringers       PRN Meds:acetaminophen, bisacodyL, diphenhydrAMINE, diphenhydrAMINE, ondansetron, oxyCODONE-acetaminophen, oxyCODONE-acetaminophen, promethazine (PHENERGAN) IVPB, simethicone    Review of patient's allergies indicates:  No Known Allergies    Objective:     Vital Signs (Most Recent):  Temp: 98.2 °F (36.8 °C) (09/23/20 0349)  Pulse: 72 (09/23/20 0349)  Resp: 20 (09/23/20 0349)  BP: (!) 109/58 (09/23/20 0349)  SpO2: 96 % (09/23/20 0349) Vital Signs (24h Range):  Temp:  [97.2 °F (36.2 °C)-98.3 °F (36.8 °C)] 98.2 °F (36.8 °C)  Pulse:  [53-75] 72  Resp:  [16-20] 20  SpO2:  [94 %-100 %] 96 %  BP: ()/(56-96) 109/58     Weight: 97.3 kg (214 lb 8.1 oz)  Body mass index is 34.62 kg/m².  Patient's last menstrual period was 09/22/2020.    I&O (Last 24H):    Intake/Output Summary (Last 24 hours) at 9/23/2020 0613  Last data filed at 9/23/2020 0400  Gross per 24 hour   Intake 4110 ml   Output 4740 ml   Net -630 ml       Physical Exam:   Constitutional: She is oriented to person, place, and time. She appears well-developed and well-nourished. No distress.   Patient does not appear to be in acute distress.     HENT:   Head: Normocephalic and atraumatic.    Eyes: Conjunctivae are normal.    Neck: Neck supple.    Cardiovascular: Normal rate, regular rhythm and intact distal pulses.     Pulmonary/Chest: Effort normal. No respiratory distress.        Abdominal: Soft. She exhibits abdominal incision (3  Trocar sites C/D/I.  ). She exhibits no distension. There is abdominal tenderness (Appropriate for post-operative period. ). There is no rebound and no guarding.     Genitourinary:    Vagina normal.      Genitourinary Comments: Hayes in place. Minimal bleeding noted on peripad.              Musculoskeletal: Normal range of motion and moves all extremeties.      Comments: SCDs in place.        Neurological: She is alert and oriented to person, place, and time.    Skin: Skin is warm and dry. She is not diaphoretic.    Psychiatric: She has a normal mood and affect. Her behavior is normal. Judgment and thought content normal.       Laboratory:  I have personallly reviewed all pertinent lab results from the last 24 hours.

## 2020-09-23 NOTE — PLAN OF CARE
No significant events overnight. Remains free from fall, injury, skin breakdown. VSS on RA throughout the night. Positions self-independently. Pain controled w/ PO/IV meds. Lap sites x 4 CDI. Peripad in place. F/c draining clear yellow urine to gravity. All alarms active and auduble. TEDs/SCDs in place. Plan of care reviewed w/ pt and all questions answered. Bed locked and in lowest position. Bed alarm and non skid socks on. Call light w/I reach. No needs at this time. Purposeful hourly rounding. Will continue to monitor.

## 2020-09-23 NOTE — PROGRESS NOTES
[facility]  Obstetrics & Gynecology  Progress Note    Patient Name: Elisa Gongora  MRN: [id number]  Admission Date: 2020  Primary Care Provider: Primary Doctor No  Principal Problem: Fibroids, intramural    Subjective:     HPI:  Elisa Gongora is a 45 y.o. female  presents for a pre-op exam for a [facility], cystoscopy scheduled on 2020.       She reports cycles are irregular and heavy. She usually has to stay at home. She uses Depends due to the heaviness. She reports clots. She is anemic and taking iron. She reports cycles are irregular. She denies dysmenorrhea. She reports painful intercourse, vaginally.      Ultrasound (under media tab), shows uterus to be approximately 12 cm, nabothian cysts on cervix and small fibroids     History reviewed. No pertinent past medical history.    Interval History: Patient states that she's doing well this AM. Patient stayed overnight after surgery after she was unable to void spontaneously post-op. After a manley was replaced 1000 cc was evacuated from the bladder. She states that her pain is controlled. She is tolerating a diet and is passing flatus. She was ambulating prior to having her manley replaced overnight.     Scheduled Meds:   ibuprofen  600 mg Oral Q6H    ketorolac  30 mg Intravenous Q8H    mupirocin   Nasal BID     Continuous Infusions:   lactated ringers       PRN Meds:acetaminophen, bisacodyL, diphenhydrAMINE, diphenhydrAMINE, ondansetron, oxyCODONE-acetaminophen, oxyCODONE-acetaminophen, promethazine (PHENERGAN) IVPB, simethicone    Review of patient's allergies indicates:  No Known Allergies    Objective:     Vital Signs (Most Recent):  Temp: 98.2 °F (36.8 °C) (20)  Pulse: 72 (20)  Resp: 20 (20)  BP: (!) 109/58 (20)  SpO2: 96 % (20) Vital Signs (24h Range):  Temp:  [97.2 °F (36.2 °C)-98.3 °F (36.8 °C)] 98.2 °F (36.8 °C)  Pulse:  [53-75] 72  Resp:  [16-20] 20  SpO2:  [94  %-100 %] 96 %  BP: ()/(56-96) 109/58     Weight: 97.3 kg (214 lb 8.1 oz)  Body mass index is 34.62 kg/m².  Patient's last menstrual period was 09/22/2020.    I&O (Last 24H):    Intake/Output Summary (Last 24 hours) at 9/23/2020 0613  Last data filed at 9/23/2020 0400  Gross per 24 hour   Intake 4110 ml   Output 4740 ml   Net -630 ml       Physical Exam:   Constitutional: She is oriented to person, place, and time. She appears well-developed and well-nourished. No distress.   Patient does not appear to be in acute distress.     HENT:   Head: Normocephalic and atraumatic.    Eyes: Conjunctivae are normal.    Neck: Neck supple.    Cardiovascular: Normal rate, regular rhythm and intact distal pulses.     Pulmonary/Chest: Effort normal. No respiratory distress.        Abdominal: Soft. She exhibits abdominal incision (3 Trocar sites C/D/I.  ). She exhibits no distension. There is abdominal tenderness (Appropriate for post-operative period. ). There is no rebound and no guarding.     Genitourinary:    Vagina normal.      Genitourinary Comments: Hayes in place. Minimal bleeding noted on peripad.              Musculoskeletal: Normal range of motion and moves all extremeties.      Comments: SCDs in place.        Neurological: She is alert and oriented to person, place, and time.    Skin: Skin is warm and dry. She is not diaphoretic.    Psychiatric: She has a normal mood and affect. Her behavior is normal. Judgment and thought content normal.       Laboratory:  I have personallly reviewed all pertinent lab results from the last 24 hours.      Assessment/Plan:     * Fibroids, intramural  - S/P TLH/BSO  - Standard post-operative care  - VSS  - UOP - Adequate    - Tolerating regular diet  - D/C IVF  - Toradol > Ibuprofen, Percocet 5/10, Dilaudid for BTP  - Encourage ambulation  - SCDs for VTE Prophylaxis  - Encoourage I.S. Use  - Passive Void trial this AM  - Anticipate discharge after conclusion of void trial.       Saundra  LISSA Travis MD  Obstetrics & Gynecology  [facility]

## 2020-09-23 NOTE — HPI
Elisa Gongora is a 45 y.o. female  presents for a pre-op exam for a [facility], cystoscopy scheduled on 2020.       She reports cycles are irregular and heavy. She usually has to stay at home. She uses Depends due to the heaviness. She reports clots. She is anemic and taking iron. She reports cycles are irregular. She denies dysmenorrhea. She reports painful intercourse, vaginally.      Ultrasound (under media tab), shows uterus to be approximately 12 cm, nabothian cysts on cervix and small fibroids     History reviewed. No pertinent past medical history.

## 2020-09-24 VITALS
RESPIRATION RATE: 18 BRPM | WEIGHT: 214.5 LBS | OXYGEN SATURATION: 97 % | BODY MASS INDEX: 34.47 KG/M2 | TEMPERATURE: 98 F | HEIGHT: 66 IN | HEART RATE: 70 BPM | SYSTOLIC BLOOD PRESSURE: 131 MMHG | DIASTOLIC BLOOD PRESSURE: 74 MMHG

## 2020-09-24 PROCEDURE — G0378 HOSPITAL OBSERVATION PER HR: HCPCS

## 2020-09-24 PROCEDURE — 99024 POSTOP FOLLOW-UP VISIT: CPT | Mod: ,,, | Performed by: OBSTETRICS & GYNECOLOGY

## 2020-09-24 PROCEDURE — 99024 PR POST-OP FOLLOW-UP VISIT: ICD-10-PCS | Mod: ,,, | Performed by: OBSTETRICS & GYNECOLOGY

## 2020-09-24 PROCEDURE — 25000003 PHARM REV CODE 250: Performed by: STUDENT IN AN ORGANIZED HEALTH CARE EDUCATION/TRAINING PROGRAM

## 2020-09-24 RX ADMIN — IBUPROFEN 600 MG: 600 TABLET, FILM COATED ORAL at 02:09

## 2020-09-24 RX ADMIN — OXYCODONE HYDROCHLORIDE AND ACETAMINOPHEN 1 TABLET: 5; 325 TABLET ORAL at 02:09

## 2020-09-24 RX ADMIN — OXYCODONE HYDROCHLORIDE AND ACETAMINOPHEN 1 TABLET: 5; 325 TABLET ORAL at 06:09

## 2020-09-24 RX ADMIN — OXYCODONE HYDROCHLORIDE AND ACETAMINOPHEN 1 TABLET: 5; 325 TABLET ORAL at 11:09

## 2020-09-24 RX ADMIN — SIMETHICONE 80 MG: 80 TABLET, CHEWABLE ORAL at 11:09

## 2020-09-24 NOTE — PLAN OF CARE
11:35 AM  Passive Voiding Trial:    @ 4 hrs -   200 cc UO  Bladder scanner shows- 189 cc    Dominique called to notify and GYN paged. Awaiting orders at this time       1:46 PM  In agreement and eager for DC.  Tolerating good PO and ambulating well.  Lap sites CDI and no vaginal bleeding to peripad.  Voiding with adequate UOP. VU of DC instructions; paperwork passed.     IV removed with cath tip intact, WNL.  To be DCd home with family.  Will be escorted downstairs via  transport team once dressed and ready.  Free from falls or skin breakdown this hospital admission.

## 2020-09-24 NOTE — PROGRESS NOTES
[facility]  Obstetrics & Gynecology  Progress Note    Patient Name: Elisa Gongora  MRN: [id number]  Admission Date: 2020  Primary Care Provider: Primary Doctor No  Principal Problem: Fibroids, intramural    Subjective:     HPI:  Elisa Gongora is a 45 y.o. female  presents for a pre-op exam for a TL, cystoscopy scheduled on 2020.       She reports cycles are irregular and heavy. She usually has to stay at home. She uses Depends due to the heaviness. She reports clots. She is anemic and taking iron. She reports cycles are irregular. She denies dysmenorrhea. She reports painful intercourse, vaginally.      Ultrasound (under media tab), shows uterus to be approximately 12 cm, nabothian cysts on cervix and small fibroids     History reviewed. No pertinent past medical history.    Interval History: Patient doing well this AM. States pain is well controlled. Tolerating a diet without nausea or vomiting. Ambulating in room without complication. Failed a voiding trial in the AM yesterday.     Scheduled Meds:   ibuprofen  600 mg Oral Q6H    mupirocin   Nasal BID     Continuous Infusions:   lactated ringers       PRN Meds:acetaminophen, bisacodyL, diphenhydrAMINE, diphenhydrAMINE, ondansetron, oxyCODONE-acetaminophen, oxyCODONE-acetaminophen, promethazine (PHENERGAN) IVPB, simethicone    Review of patient's allergies indicates:  No Known Allergies    Objective:     Vital Signs (Most Recent):  Temp: 98 °F (36.7 °C) (20 0359)  Pulse: 73 (20 0359)  Resp: 20 (20 0609)  BP: 121/68 (20 0359)  SpO2: 96 % (20 0359) Vital Signs (24h Range):  Temp:  [98 °F (36.7 °C)-98.6 °F (37 °C)] 98 °F (36.7 °C)  Pulse:  [62-81] 73  Resp:  [12-20] 20  SpO2:  [96 %-100 %] 96 %  BP: (116-126)/(63-75) 121/68     Weight: 97.3 kg (214 lb 8.1 oz)  Body mass index is 34.62 kg/m².  Patient's last menstrual period was 2020.    I&O (Last 24H):    Intake/Output Summary (Last 24  hours) at 9/24/2020 0615  Last data filed at 9/24/2020 0400  Gross per 24 hour   Intake 360 ml   Output 825 ml   Net -465 ml       Physical Exam:   Constitutional: She is oriented to person, place, and time. She appears well-developed and well-nourished. No distress.   Patient does not appear to be in acute distress.     HENT:   Head: Normocephalic and atraumatic.    Eyes: Conjunctivae are normal.    Neck: Neck supple.    Cardiovascular: Normal rate, regular rhythm and intact distal pulses.     Pulmonary/Chest: Effort normal. No respiratory distress.        Abdominal: Soft. She exhibits abdominal incision (3 Trocar sites C/D/I.  ). She exhibits no distension. There is abdominal tenderness (Appropriate for post-operative period. ). There is no rebound and no guarding.     Genitourinary:    Vagina normal.      Genitourinary Comments: Hayes in place. Minimal bleeding noted at perineum.             Musculoskeletal: Normal range of motion and moves all extremeties.      Comments: SCDs in place.        Neurological: She is alert and oriented to person, place, and time.    Skin: Skin is warm and dry. She is not diaphoretic.    Psychiatric: She has a normal mood and affect. Her behavior is normal. Judgment and thought content normal.       Laboratory:  I have personallly reviewed all pertinent lab results from the last 24 hours.      Assessment/Plan:     * Fibroids, intramural  - S/P TLH/BSO  - Standard post-operative care  - VSS  - UOP - Adequate  .51 cc/kg/hr overnight. Encouraged PO Intake  - Tolerating regular diet  - D/C IVF  - Toradol > Ibuprofen, Percocet 5/10, Dilaudid for BTP  - Encourage ambulation  - SCDs for VTE Prophylaxis  - Encoourage I.S. Use      Urinary retention  - Patient failed her immediate post-op void trial and 1000 cc was drained from the bladder at that time  - Patient also failed passive void trial on POD #1. Hayes replaced.   - Will perform passive void trial this AM  - While adamantly opposed to  being discharged with a manley, the patient was counseled that discharge with a manley would required if she was unable to passive this void trial.         Saundra Travis MD  Obstetrics & Gynecology  [facility]

## 2020-09-24 NOTE — ASSESSMENT & PLAN NOTE
- Patient failed her immediate post-op void trial and 1000 cc was drained from the bladder at that time  - Patient also failed passive void trial on POD #1. Manley replaced.   - Will perform passive void trial this AM  - While adamantly opposed to being discharged with a manlye, the patient was counseled that discharge with a manley would required if she was unable to passive this void trial.

## 2020-09-24 NOTE — SUBJECTIVE & OBJECTIVE
Interval History: Patient doing well this AM. States pain is well controlled. Tolerating a diet without nausea or vomiting. Ambulating in room without complication. Failed a voiding trial in the AM yesterday.     Scheduled Meds:   ibuprofen  600 mg Oral Q6H    mupirocin   Nasal BID     Continuous Infusions:   lactated ringers       PRN Meds:acetaminophen, bisacodyL, diphenhydrAMINE, diphenhydrAMINE, ondansetron, oxyCODONE-acetaminophen, oxyCODONE-acetaminophen, promethazine (PHENERGAN) IVPB, simethicone    Review of patient's allergies indicates:  No Known Allergies    Objective:     Vital Signs (Most Recent):  Temp: 98 °F (36.7 °C) (09/24/20 0359)  Pulse: 73 (09/24/20 0359)  Resp: 20 (09/24/20 0609)  BP: 121/68 (09/24/20 0359)  SpO2: 96 % (09/24/20 0359) Vital Signs (24h Range):  Temp:  [98 °F (36.7 °C)-98.6 °F (37 °C)] 98 °F (36.7 °C)  Pulse:  [62-81] 73  Resp:  [12-20] 20  SpO2:  [96 %-100 %] 96 %  BP: (116-126)/(63-75) 121/68     Weight: 97.3 kg (214 lb 8.1 oz)  Body mass index is 34.62 kg/m².  Patient's last menstrual period was 09/22/2020.    I&O (Last 24H):    Intake/Output Summary (Last 24 hours) at 9/24/2020 0615  Last data filed at 9/24/2020 0400  Gross per 24 hour   Intake 360 ml   Output 825 ml   Net -465 ml       Physical Exam:   Constitutional: She is oriented to person, place, and time. She appears well-developed and well-nourished. No distress.   Patient does not appear to be in acute distress.     HENT:   Head: Normocephalic and atraumatic.    Eyes: Conjunctivae are normal.    Neck: Neck supple.    Cardiovascular: Normal rate, regular rhythm and intact distal pulses.     Pulmonary/Chest: Effort normal. No respiratory distress.        Abdominal: Soft. She exhibits abdominal incision (3 Trocar sites C/D/I.  ). She exhibits no distension. There is abdominal tenderness (Appropriate for post-operative period. ). There is no rebound and no guarding.     Genitourinary:    Vagina normal.       Genitourinary Comments: Hayes in place. Minimal bleeding noted at perineum.             Musculoskeletal: Normal range of motion and moves all extremeties.      Comments: SCDs in place.        Neurological: She is alert and oriented to person, place, and time.    Skin: Skin is warm and dry. She is not diaphoretic.    Psychiatric: She has a normal mood and affect. Her behavior is normal. Judgment and thought content normal.       Laboratory:  I have personallly reviewed all pertinent lab results from the last 24 hours.

## 2020-09-24 NOTE — ASSESSMENT & PLAN NOTE
- S/P TLH/BSO  - Standard post-operative care  - VSS  - UOP - Adequate  .51 cc/kg/hr overnight. Encouraged PO Intake  - Tolerating regular diet  - D/C IVF  - Toradol > Ibuprofen, Percocet 5/10, Dilaudid for BTP  - Encourage ambulation  - SCDs for VTE Prophylaxis  - Encoourage I.S. Use

## 2020-09-24 NOTE — DISCHARGE SUMMARY
[facility]  Obstetrics & Gynecology  Discharge Summary    Patient Name: Elisa Gongora  MRN: [id number]  Admission Date: 2020  Hospital Length of Stay: 0 days  Discharge Date and Time:  2020 12:46 PM  Attending Physician: Cyndie Hameed MD   Discharging Provider: Saundra Travis MD  Primary Care Provider: Primary Doctor No    HPI:  Elisa Gongora is a 45 y.o. female  presents for a pre-op exam for a TLH, cystoscopy scheduled on 2020.       She reports cycles are irregular and heavy. She usually has to stay at home. She uses Depends due to the heaviness. She reports clots. She is anemic and taking iron. She reports cycles are irregular. She denies dysmenorrhea. She reports painful intercourse, vaginally.      Ultrasound (under media tab), shows uterus to be approximately 12 cm, nabothian cysts on cervix and small fibroids     History reviewed. No pertinent past medical history.    Hospital Course:  2020 - Patient underwent TLH/BSO for SUF/AUB  2020 - Appropriate post-op advances apart from urinary retention following passive void trial  2020 - NAEON. Patient was able to void spontaneously without any clinically significant urinary retention. Patient meeting all post-operative goals and will be discharged in good condition.  All appropriate education, precautions and follow-up were provided prior to discharge.      Procedure(s) (LRB):  HYSTERECTOMY, TOTAL, LAPAROSCOPIC with bilateral salpingectomy and oopherectomy (N/A)  CYSTOSCOPY (N/A)         Significant Diagnostic Studies: Labs: All labs within the past 24 hours have been reviewed      Pending Diagnostic Studies:     Procedure Component Value Units Date/Time    Specimen to Pathology, Surgery Gynecology and Obstetrics [[id number]] Collected: 20 1315    Order Status: Sent Lab Status: In process Updated: 20 1401        Final Active Diagnoses:    Diagnosis Date Noted POA    PRINCIPAL  PROBLEM:  Fibroids, intramural [D25.1] 09/22/2020 Yes    Urinary retention [R33.9] 09/23/2020 No      Problems Resolved During this Admission:        Discharged Condition: good    Disposition: Home or Self Care    Follow Up:  Follow-up Information     Schedule an appointment as soon as possible for a visit with Cyndie Hameed MD.    Specialties: Obstetrics, Obstetrics and Gynecology  Why: For Post-Op Visit  Contact information:  [address] [place] [address]  [phone]                 Patient Instructions:   No discharge procedures on file.  Medications:  Reconciled Home Medications:      Medication List      START taking these medications    HYDROcodone-acetaminophen 5-325 mg per tablet  Commonly known as: NORCO  Take 1 tablet by mouth every 6 (six) hours as needed for Pain.     ibuprofen 800 MG tablet  Commonly known as: ADVIL,MOTRIN  Take 1 tablet (800 mg total) by mouth 3 (three) times daily.        CONTINUE taking these medications    acetaminophen 325 MG tablet  Commonly known as: TYLENOL  Take 325 mg by mouth every 6 (six) hours as needed for Pain.     ferrous gluconate 324 MG tablet  Commonly known as: FERGON  Take 324 mg by mouth 2 (two) times daily.            Saundra Travis MD  Obstetrics & Gynecology  [facility]

## 2020-09-28 LAB
FINAL PATHOLOGIC DIAGNOSIS: NORMAL
GROSS: NORMAL

## 2020-09-30 ENCOUNTER — TELEPHONE (OUTPATIENT)
Dept: OBSTETRICS AND GYNECOLOGY | Facility: CLINIC | Age: 45
End: 2020-09-30

## 2020-10-05 ENCOUNTER — OFFICE VISIT (OUTPATIENT)
Dept: OBSTETRICS AND GYNECOLOGY | Facility: CLINIC | Age: 45
End: 2020-10-05
Payer: COMMERCIAL

## 2020-10-05 VITALS — WEIGHT: 212.5 LBS | BODY MASS INDEX: 34.3 KG/M2 | DIASTOLIC BLOOD PRESSURE: 100 MMHG | SYSTOLIC BLOOD PRESSURE: 142 MMHG

## 2020-10-05 DIAGNOSIS — T81.89XA INCISIONAL IRRITATION, INITIAL ENCOUNTER: ICD-10-CM

## 2020-10-05 DIAGNOSIS — Z09 SURGERY FOLLOW-UP: Primary | ICD-10-CM

## 2020-10-05 PROCEDURE — 99999 PR PBB SHADOW E&M-EST. PATIENT-LVL III: ICD-10-PCS | Mod: PBBFAC,,, | Performed by: NURSE PRACTITIONER

## 2020-10-05 PROCEDURE — 99024 PR POST-OP FOLLOW-UP VISIT: ICD-10-PCS | Mod: ,,, | Performed by: NURSE PRACTITIONER

## 2020-10-05 PROCEDURE — 99213 OFFICE O/P EST LOW 20 MIN: CPT | Mod: PBBFAC | Performed by: NURSE PRACTITIONER

## 2020-10-05 PROCEDURE — 99024 POSTOP FOLLOW-UP VISIT: CPT | Mod: ,,, | Performed by: NURSE PRACTITIONER

## 2020-10-05 PROCEDURE — 99999 PR PBB SHADOW E&M-EST. PATIENT-LVL III: CPT | Mod: PBBFAC,,, | Performed by: NURSE PRACTITIONER

## 2020-10-05 RX ORDER — MUPIROCIN 20 MG/G
OINTMENT TOPICAL 3 TIMES DAILY
Qty: 30 G | Refills: 0 | Status: SHIPPED | OUTPATIENT
Start: 2020-10-05

## 2020-10-05 NOTE — PROGRESS NOTES
CC: 2 week post op    Elisa Gongora is a 45 y.o. female  post-op from a TLS, BSO and cystoscopy  on 20.  Patient is doing well postoperatively.  Minimal pain- continuing to use Ibuprofen.  Reports constipation depite Colace and OTC laxative.  Reports pellet like hard stools.  No issues with urinatin.  No fever.  The pathology was benign.     ROS:  GENERAL: No fever, chills, fatigability or weight loss.  VULVAR: No pain, no lesions and no itching.  VAGINAL: No relaxation, no itching, no discharge, no abnormal bleeding and no lesions.  ABDOMEN: No abdominal pain. Denies nausea. Denies vomiting. No diarrhea. No constipation  BREAST: Denies pain. No lumps. No discharge.  URINARY: No incontinence, no nocturia, no frequency and no dysuria.  CARDIOVASCULAR: No chest pain. No shortness of breath. No leg cramps.  NEUROLOGICAL: No headaches. No vision changes.    PE:   APPEARANCE: Well nourished, well developed, in no acute distress.  Pelvis: Deferred   Abdomen: No masses, tenderness, hernia or ascites, no hepatosplenomegaly. Davinci port sites healing well- right port with a small 2 mm opening- no surrounding erythema, warmth, or drainage.  Skin: No rashes, lesions, ulcers, acne, hirsutism.  Peripheral/lower extremities: No edema, erythema or tenderness.  Lymphatic: No groin nodes palp.  Mental Status: Alert, oriented x 3, normal affect and mood       Diagnosis:  1. Surgery follow-up    2. Incisional irritation, initial encounter        Plan:     Orders Placed This Encounter    mupirocin (BACTROBAN) 2 % ointment         Plan:  Discussed suppository for constipation pt declines.  Instructed to trial OTC Mag Citrate and notify clinic if she remains constipated.  Continue Colace BID  Mupirocin ointment BID to right port site.   Discussed to cleanse with antibacterial soap and leave sites open to air  Discussed to monitor for S/S of infection and to RTC if this occurs   Continue pelvic rest x 12 weeks. Patient  verbalized understanding.      F/U with Dr. Hameed for post op check in 4 week or PRN    ALLEY RiosP-C

## 2020-11-02 ENCOUNTER — OFFICE VISIT (OUTPATIENT)
Dept: OBSTETRICS AND GYNECOLOGY | Facility: CLINIC | Age: 45
End: 2020-11-02
Attending: OBSTETRICS & GYNECOLOGY
Payer: COMMERCIAL

## 2020-11-02 VITALS
WEIGHT: 214.75 LBS | SYSTOLIC BLOOD PRESSURE: 128 MMHG | BODY MASS INDEX: 34.66 KG/M2 | DIASTOLIC BLOOD PRESSURE: 86 MMHG

## 2020-11-02 DIAGNOSIS — Z09 POSTOP CHECK: Primary | ICD-10-CM

## 2020-11-02 PROBLEM — R33.9 URINARY RETENTION: Status: RESOLVED | Noted: 2020-09-23 | Resolved: 2020-11-02

## 2020-11-02 PROBLEM — D25.1 FIBROIDS, INTRAMURAL: Status: RESOLVED | Noted: 2020-09-22 | Resolved: 2020-11-02

## 2020-11-02 PROCEDURE — 99999 PR PBB SHADOW E&M-EST. PATIENT-LVL II: CPT | Mod: PBBFAC,,, | Performed by: OBSTETRICS & GYNECOLOGY

## 2020-11-02 PROCEDURE — 99024 POSTOP FOLLOW-UP VISIT: CPT | Mod: S$GLB,,, | Performed by: OBSTETRICS & GYNECOLOGY

## 2020-11-02 PROCEDURE — 99999 PR PBB SHADOW E&M-EST. PATIENT-LVL II: ICD-10-PCS | Mod: PBBFAC,,, | Performed by: OBSTETRICS & GYNECOLOGY

## 2020-11-02 PROCEDURE — 99024 PR POST-OP FOLLOW-UP VISIT: ICD-10-PCS | Mod: S$GLB,,, | Performed by: OBSTETRICS & GYNECOLOGY

## 2020-11-02 NOTE — LETTER
November 2, 2020    Elisa Gongora  [address] [address]         [facility], [place] [facility] [address]  [address]  [facility] [address]  Phone: [phone]  Fax: [phone] November 2, 2020     Patient: Elisa Gongora   YOB: 1975   Date of Visit: 11/2/2020       To Whom It May Concern:    It is my medical opinion that Elisa Gongora may return to work on Wednesday, November 4.    If you have any questions or concerns, please don't hesitate to call.    Sincerely,        Cyndie Hameed MD

## 2020-11-02 NOTE — PROGRESS NOTES
CC: Postoperative visit    Elisa Gongora is a 45 y.o. female  presents for a postoperative visit s/p TLH, BS, cystoscopy on 2020.  Her postoperative course was uncomplicated.  She is doing well postoperative.    She reports constipation. She is using a stool softner twice a day and Magnesium Citrate once a week. She reports some blood in her stool.     Reports irritation on the mons - shaved about 2 weeks ago. No vaginal itching or discharge.    Pathology showed:  Final Pathologic Diagnosis UTERUS, CERVIX AND BILATERAL ADNEXA WEIGHING 557 G SHOWING::   SECRETORY ENDOMETRIUM   CERVIX WITH A NABOTHIAN CYST   MULTIPLE INTRAMURAL LEIOMYOMAS   RIGHT OVARY WITH A BENIGN LEE TUMOR (1.6 CM) AND UNREMARKABLE RIGHT   FALLOPIAN TUBE   LEFT OVARY WITH A HEMORRHAGIC CORPUS LUTEUM AND UNREMARKABLE LEFT FALLOPIAN   TUBE          /86   Wt 97.4 kg (214 lb 11.7 oz)   LMP 2020   BMI 34.66 kg/m²     ROS:  GENERAL: No fever, chills, fatigability or weight loss.  VULVAR: No pain, no lesions and no itching.  VAGINAL: No relaxation, no itching, no discharge, no abnormal bleeding and no lesions.  ABDOMEN: No abdominal pain. Denies nausea. Denies vomiting. No diarrhea. No constipation  BREAST: Denies pain. No lumps. No discharge.  URINARY: No incontinence, no nocturia, no frequency and no dysuria.  CARDIOVASCULAR: No chest pain. No shortness of breath. No leg cramps.  NEUROLOGICAL: No headaches. No vision changes.    Physical Exam    INCISIONS: Clean, dry, intact. Well healed.   PELVIC: Normal external genitalia without lesions.  Normal hair distribution.  Adequate perineal body, normal urethral meatus.  Vagina moist and well rugated without lesions or discharge.  Vaginal cuff intact. Well healed. No significant cystocele or rectocele.  Bimanual exam shows uterus to be surgically absent.  Adnexa without masses or tenderness.      1. Postop check         Patient can return to normal activities but delay  intercourse for 3 weeks.    Return to clinic in 1 year for well woman exam.    Recommended f/u with PCP regarding blood in stool. Advised she should have a colonoscopy to ensure rectal bleeding is not from a pathologic cause.

## 2021-03-01 ENCOUNTER — CLINICAL SUPPORT (OUTPATIENT)
Dept: URGENT CARE | Facility: CLINIC | Age: 46
End: 2021-03-01
Payer: COMMERCIAL

## 2021-03-01 ENCOUNTER — OFFICE VISIT (OUTPATIENT)
Dept: URGENT CARE | Facility: CLINIC | Age: 46
End: 2021-03-01
Payer: COMMERCIAL

## 2021-03-01 VITALS
SYSTOLIC BLOOD PRESSURE: 125 MMHG | BODY MASS INDEX: 32.78 KG/M2 | HEIGHT: 66 IN | TEMPERATURE: 100 F | WEIGHT: 204 LBS | OXYGEN SATURATION: 99 % | DIASTOLIC BLOOD PRESSURE: 83 MMHG | HEART RATE: 68 BPM | RESPIRATION RATE: 19 BRPM

## 2021-03-01 DIAGNOSIS — Z20.822 EXPOSURE TO COVID-19 VIRUS: Primary | ICD-10-CM

## 2021-03-01 DIAGNOSIS — R11.0 NAUSEA: Primary | ICD-10-CM

## 2021-03-01 DIAGNOSIS — R11.10 NON-INTRACTABLE VOMITING, PRESENCE OF NAUSEA NOT SPECIFIED, UNSPECIFIED VOMITING TYPE: ICD-10-CM

## 2021-03-01 LAB
CTP QC/QA: YES
SARS-COV-2 RDRP RESP QL NAA+PROBE: NEGATIVE

## 2021-03-01 PROCEDURE — U0002: ICD-10-PCS | Mod: QW,S$GLB,, | Performed by: NURSE PRACTITIONER

## 2021-03-01 PROCEDURE — 3008F BODY MASS INDEX DOCD: CPT | Mod: CPTII,S$GLB,, | Performed by: NURSE PRACTITIONER

## 2021-03-01 PROCEDURE — U0002 COVID-19 LAB TEST NON-CDC: HCPCS | Mod: QW,S$GLB,, | Performed by: NURSE PRACTITIONER

## 2021-03-01 PROCEDURE — 3008F PR BODY MASS INDEX (BMI) DOCUMENTED: ICD-10-PCS | Mod: CPTII,S$GLB,, | Performed by: NURSE PRACTITIONER

## 2021-03-01 PROCEDURE — 99213 PR OFFICE/OUTPT VISIT, EST, LEVL III, 20-29 MIN: ICD-10-PCS | Mod: S$GLB,,, | Performed by: NURSE PRACTITIONER

## 2021-03-01 PROCEDURE — 99213 OFFICE O/P EST LOW 20 MIN: CPT | Mod: S$GLB,,, | Performed by: NURSE PRACTITIONER

## 2021-03-01 RX ORDER — ONDANSETRON 4 MG/1
4 TABLET, ORALLY DISINTEGRATING ORAL EVERY 8 HOURS PRN
Qty: 15 TABLET | Refills: 0 | Status: SHIPPED | OUTPATIENT
Start: 2021-03-01

## 2022-11-24 ENCOUNTER — OFFICE VISIT (OUTPATIENT)
Dept: URGENT CARE | Facility: CLINIC | Age: 47
End: 2022-11-24
Payer: OTHER MISCELLANEOUS

## 2022-11-24 VITALS
DIASTOLIC BLOOD PRESSURE: 84 MMHG | TEMPERATURE: 98 F | WEIGHT: 204 LBS | BODY MASS INDEX: 32.78 KG/M2 | RESPIRATION RATE: 20 BRPM | SYSTOLIC BLOOD PRESSURE: 134 MMHG | HEIGHT: 66 IN | OXYGEN SATURATION: 98 % | HEART RATE: 95 BPM

## 2022-11-24 DIAGNOSIS — S89.91XA KNEE INJURY, RIGHT, INITIAL ENCOUNTER: ICD-10-CM

## 2022-11-24 DIAGNOSIS — S69.91XA HAND INJURY, RIGHT, INITIAL ENCOUNTER: Primary | ICD-10-CM

## 2022-11-24 PROCEDURE — 73562 XR KNEE 3 VIEW RIGHT: ICD-10-PCS | Mod: FY,RT,S$GLB, | Performed by: RADIOLOGY

## 2022-11-24 PROCEDURE — 99213 PR OFFICE/OUTPT VISIT, EST, LEVL III, 20-29 MIN: ICD-10-PCS | Mod: S$GLB,,, | Performed by: PHYSICIAN ASSISTANT

## 2022-11-24 PROCEDURE — 73130 X-RAY EXAM OF HAND: CPT | Mod: FY,RT,S$GLB, | Performed by: RADIOLOGY

## 2022-11-24 PROCEDURE — 73562 X-RAY EXAM OF KNEE 3: CPT | Mod: FY,RT,S$GLB, | Performed by: RADIOLOGY

## 2022-11-24 PROCEDURE — 99213 OFFICE O/P EST LOW 20 MIN: CPT | Mod: S$GLB,,, | Performed by: PHYSICIAN ASSISTANT

## 2022-11-24 PROCEDURE — 73130 XR HAND COMPLETE 3 VIEW RIGHT: ICD-10-PCS | Mod: FY,RT,S$GLB, | Performed by: RADIOLOGY

## 2022-11-24 NOTE — PROGRESS NOTES
Subjective:       Patient ID: Elisa Gongora is a 47 y.o. female.  Vitals:    11/24/22 1257   BP: 134/84   Pulse: 95   Resp: 20   Temp: 98.3 °F (36.8 °C)      Chief Complaint: Knee Injury    Pt complains of right knee and right 5th digit finger injury after slipping on wet floor at work, incident occurred at 9 am on 11/24/2022.     Provider note: Patient did not fall but slipped on the floor landing on her right knee and bracing with her right hand onto a tile floor. Pt rates her  right hand pain 6/10 pulsing pain. Pain with 5th digit DIP flexion. Pt right knee is a aching pain lateral to the knee cap. Rates a 8/10.     Knee Injury  This is a new problem. The current episode started today. The problem occurs constantly. The problem has been unchanged. Associated symptoms include arthralgias, joint swelling and myalgias. Pertinent negatives include no abdominal pain, chest pain, chills, congestion, coughing, diaphoresis, fatigue, fever, headaches, neck pain, numbness or rash. The symptoms are aggravated by standing, walking and bending. She has tried NSAIDs (aleve) for the symptoms.     Constitution: Negative for appetite change, chills, sweating, fatigue and fever.   HENT:  Negative for congestion.    Neck: Negative for neck pain and neck stiffness.   Cardiovascular:  Negative for chest trauma, chest pain and sob on exertion.   Eyes:  Negative for eye trauma.   Respiratory:  Negative for chest tightness, cough and shortness of breath.    Gastrointestinal:  Negative for abdominal trauma and abdominal pain.   Musculoskeletal:  Positive for pain, trauma, joint pain, joint swelling, abnormal ROM of joint and muscle ache. Negative for arthritis, gout, back pain and muscle cramps.   Skin:  Negative for color change, pale, rash, erythema and bruising.   Neurological:  Negative for dizziness, coordination disturbances, loss of balance, headaches, disorientation, altered mental status, loss of consciousness, numbness,  seizures and tremors.   Psychiatric/Behavioral:  Negative for altered mental status, disorientation and confusion.    History reviewed. No pertinent past medical history.    Past Surgical History:   Procedure Laterality Date     SECTION      CYSTOSCOPY N/A 2020    Procedure: CYSTOSCOPY;  Surgeon: Cyndie Hameed MD;  [facility];  Service: OB/GYN;  Laterality: N/A;    LAPAROSCOPIC CHOLECYSTECTOMY N/A 2020    Procedure: CHOLECYSTECTOMY, LAPAROSCOPIC;  Surgeon: Landon Dunne Jr., MD;  Location: [facility];  Service: General;  Laterality: N/A;    LAPAROSCOPIC TOTAL HYSTERECTOMY N/A 2020    Procedure: HYSTERECTOMY, TOTAL, LAPAROSCOPIC with bilateral salpingectomy and oopherectomy;  Surgeon: Cyndie Hameed MD;  Location: [facility];  Service: OB/GYN;  Laterality: N/A;       Family History   Problem Relation Age of Onset    Cancer Mother         Lung    Breast cancer Maternal Aunt     Colon cancer Neg Hx     Ovarian cancer Neg Hx        Social History     Socioeconomic History    Marital status: Single   Tobacco Use    Smoking status: Never    Smokeless tobacco: Never   Substance and Sexual Activity    Alcohol use: Yes     Comment: social    Drug use: Never    Sexual activity: Yes     Partners: Male     Birth control/protection: None       Current Outpatient Medications   Medication Sig Dispense Refill    ondansetron (ZOFRAN-ODT) 4 MG TbDL Take 1 tablet (4 mg total) by mouth every 8 (eight) hours as needed (nausea). 15 tablet 0    acetaminophen (TYLENOL) 325 MG tablet Take 325 mg by mouth every 6 (six) hours as needed for Pain.      ferrous gluconate (FERGON) 324 MG tablet Take 324 mg by mouth 2 (two) times daily.      HYDROcodone-acetaminophen (NORCO) 5-325 mg per tablet Take 1 tablet by mouth every 6 (six) hours as needed for Pain. (Patient not taking: Reported on 3/1/2021) 25 tablet 0    ibuprofen (ADVIL,MOTRIN) 800 MG tablet Take 1 tablet (800 mg total) by mouth 3 (three) times  daily. (Patient not taking: Reported on 3/1/2021) 60 tablet 0    mupirocin (BACTROBAN) 2 % ointment Apply topically 3 (three) times daily. (Patient not taking: Reported on 3/1/2021) 30 g 0     No current facility-administered medications for this visit.       Review of patient's allergies indicates:  No Known Allergies   Objective:      Physical Exam  Vitals and nursing note reviewed.   Constitutional:       General: She is not in acute distress.     Appearance: Normal appearance. She is not ill-appearing, toxic-appearing or diaphoretic.   HENT:      Head: Normocephalic and atraumatic.      Right Ear: External ear normal.      Left Ear: External ear normal.      Nose: Nose normal.   Eyes:      Conjunctiva/sclera: Conjunctivae normal.   Cardiovascular:      Rate and Rhythm: Normal rate and regular rhythm.      Pulses: Normal pulses.      Heart sounds: Normal heart sounds. No murmur heard.    No friction rub. No gallop.   Pulmonary:      Effort: Pulmonary effort is normal. No respiratory distress.      Breath sounds: Normal breath sounds. No stridor. No wheezing, rhonchi or rales.   Musculoskeletal:         General: Swelling, tenderness and signs of injury present. No deformity.      Right elbow: Normal.      Right forearm: Normal.      Right wrist: Normal.      Right hand: Swelling, tenderness and bony tenderness present. No deformity or lacerations. Decreased range of motion. Decreased strength. Normal sensation. Normal capillary refill. Normal pulse.        Arms:       Cervical back: Normal, normal range of motion and neck supple.      Thoracic back: Normal.      Lumbar back: Normal.      Right knee: Swelling and bony tenderness present. No deformity, erythema, ecchymosis, lacerations or crepitus. Normal range of motion. Tenderness present over the lateral joint line and LCL. No medial joint line, MCL, ACL, PCL or patellar tendon tenderness. No LCL laxity, MCL laxity, ACL laxity or PCL laxity. Abnormal meniscus.  Normal alignment. Normal pulse.      Instability Tests: Anterior drawer test negative. Posterior drawer test negative.      Right lower leg: Normal. No edema.      Left lower leg: No edema.      Right ankle: Normal.        Legs:       Comments: Positive lateral mensicus thessaly test   Skin:     General: Skin is warm and dry.      Coloration: Skin is not pale.      Findings: No bruising, erythema or rash.   Neurological:      Mental Status: She is alert and oriented to person, place, and time.      Sensory: No sensory deficit.      Motor: No weakness.      Coordination: Coordination normal.   Psychiatric:         Mood and Affect: Mood normal.         Behavior: Behavior normal.     X-Ray Hand 3 View Right    Result Date: 11/24/2022  EXAMINATION: XR HAND COMPLETE 3 VIEW RIGHT CLINICAL HISTORY: Unspecified injury of right wrist, hand and finger(s), initial encounter TECHNIQUE: PA, lateral, and oblique views of the right hand were performed. COMPARISON: None FINDINGS: Bones are well mineralized.  Slight ulnar minus variance.  No displaced fracture, dislocation or destructive osseous process. Joint spaces appear relatively maintained.  No subcutaneous emphysema or radiodense retained foreign body.     No acute displaced fracture-dislocation identified. Electronically signed by: Jefe Munguia MD Date:    11/24/2022 Time:    12:59    XR KNEE 3 VIEW RIGHT    Result Date: 11/24/2022  EXAMINATION: XR KNEE 3 VIEW RIGHT CLINICAL HISTORY: Unspecified injury of right wrist, hand and finger(s), initial encounter TECHNIQUE: AP, lateral, and Merchant views of the right knee were performed. COMPARISON: None FINDINGS: Bones are well mineralized.  There is slight lateral tilt of the bilateral patella.  No displaced fracture, dislocation or destructive osseous process.  Minimal tricompartmental degenerative change.  No large suprapatellar joint effusion.  No subcutaneous emphysema or radiodense retained foreign body.     No acute  displaced fracture-dislocation identified. Electronically signed by: Jefe Munguia MD Date:    11/24/2022 Time:    13:00     Assessment:       1. Hand injury, right, initial encounter    2. Knee injury, right, initial encounter        Plan:       Results reviewed  Patient Instructions   Use tylenol/ibuprofen for pain relief/swelling. Ice for 15 min both areas 3-4 times a day. Keep brace on until evaluated by occupational medicine. If your symptoms worsen please follow up with ER.

## 2022-11-24 NOTE — PATIENT INSTRUCTIONS
Use tylenol/ibuprofen for pain relief/swelling. Ice for 15 min both areas 3-4 times a day. Keep brace on until evaluated by occupational medicine. If your symptoms worsen please follow up with ER.

## 2022-11-28 ENCOUNTER — OFFICE VISIT (OUTPATIENT)
Dept: URGENT CARE | Facility: CLINIC | Age: 47
End: 2022-11-28
Payer: OTHER MISCELLANEOUS

## 2022-11-28 VITALS
BODY MASS INDEX: 36.32 KG/M2 | SYSTOLIC BLOOD PRESSURE: 151 MMHG | HEART RATE: 74 BPM | RESPIRATION RATE: 18 BRPM | OXYGEN SATURATION: 100 % | HEIGHT: 66 IN | WEIGHT: 226 LBS | DIASTOLIC BLOOD PRESSURE: 84 MMHG | TEMPERATURE: 99 F

## 2022-11-28 DIAGNOSIS — S63.696A OTHER SPRAIN OF RIGHT LITTLE FINGER, INITIAL ENCOUNTER: ICD-10-CM

## 2022-11-28 DIAGNOSIS — S80.01XA CONTUSION OF RIGHT KNEE, INITIAL ENCOUNTER: Primary | ICD-10-CM

## 2022-11-28 LAB
CTP QC/QA: YES
POC 5 PANEL DRUG SCREEN: NEGATIVE

## 2022-11-28 PROCEDURE — 80305 POCT RAPID DRUG SCREEN 5 PANEL: ICD-10-PCS | Mod: S$GLB,,, | Performed by: NURSE PRACTITIONER

## 2022-11-28 PROCEDURE — 99203 OFFICE O/P NEW LOW 30 MIN: CPT | Mod: S$GLB,,, | Performed by: EMERGENCY MEDICINE

## 2022-11-28 PROCEDURE — 99203 PR OFFICE/OUTPT VISIT, NEW, LEVL III, 30-44 MIN: ICD-10-PCS | Mod: S$GLB,,, | Performed by: EMERGENCY MEDICINE

## 2022-11-28 PROCEDURE — 80305 DRUG TEST PRSMV DIR OPT OBS: CPT | Mod: S$GLB,,, | Performed by: NURSE PRACTITIONER

## 2022-11-28 NOTE — LETTER
[facility] - Occupational Health  [address] [address]  Phone: [phone]  Fax: [phone]  [facility]: [id number]    Pt Name: Elisa Gongora  Injury Date: 11/24/2022   Employee ID: [id number] Date of First Treatment: 11/28/2022   Company: [employer]      Appointment Time: 02:30 PM Arrived: 2:19 PM   Provider: Jefe Dick MD Time Out: 3:58 PM     Office Treatment:   1. Contusion of right knee, initial encounter    2. Other sprain of right little finger, initial encounter          Patient Instructions: Attention not to aggravate affected area, Daily home exercises/warm soaks      Restrictions: Avoid climbing/kneeling/squatting, Limited use of right hand and arm     Return Appointment: 12/5/2022 at 10:30 AM [facility] [place]

## 2022-11-28 NOTE — PROGRESS NOTES
Subjective:       Patient ID: Elisa Gongora is a 47 y.o. female.    Chief Complaint: No chief complaint on file.    New injury New patient (11/24/22) BARNETT-RIGHT HAND RIGHT KNEE  [employer] -  She states falling and hurt her hand and knee on the right side  .LM    PATIENT REPORTS SIGNIFICANT IMPROVEMENT OF THE RIGHT KNEE PAIN AS WELL AS THE RIGHT 5TH DIGIT.  SHE STILL HAS SOME MILD RIGHT INFRAPATELLAR SWELLING AND PAIN BUT RANGE OF MOTION IS INTACT AND DISTALLY NEUROVASCULARLY INTACT.  SPLINT REMOVED AND SHE DOES HAVE SOME STIFFNESS OF THE RIGHT 5TH DIGIT WHICH WAS HYPEREXTENDED CONSISTENT WITH FINGER SPRAIN.  SHE IS BEEN TAKING IBUPROFEN WITH SUCCESS AND WILL ALLOW TO WORK WITH RESTRICTIONS OF NO HEAVY LIFTING WITH RIGHT UPPER EXTREMITY AND NO SQUATTING OR KNEELING OR CLIMBING.  ANTICIPATE DISCHARGE TO REGULAR DUTY AT NEXT VISIT AND LIFTING OF ALL RESTRICTIONS.  I HAVE REVIEWED THE X-RAYS WHICH WERE NEGATIVE.      Knee Injury  This is a new problem. The current episode started today. The problem occurs constantly. The problem has been unchanged. Associated symptoms include arthralgias, joint swelling, myalgias and numbness. Pertinent negatives include no abdominal pain, chest pain, chills, congestion, coughing, diaphoresis, fatigue, fever, headaches, neck pain or rash. The symptoms are aggravated by standing, walking and bending. She has tried NSAIDs (aleve) for the symptoms.       ROS    Constitution: Positive for activity change and generalized weakness. Negative for chills, sweating, fatigue and fever.   HENT:  Negative for congestion.    Neck: Negative for neck pain.   Cardiovascular:  Negative for chest pain.   Respiratory:  Negative for cough.    Gastrointestinal:  Negative for abdominal pain.   Musculoskeletal:  Positive for joint pain, joint swelling and muscle ache.   Skin:  Negative for rash and erythema.   Neurological:  Positive for numbness and tingling. Negative for headaches.    Psychiatric/Behavioral:  Positive for sleep disturbance.       Objective:      Physical Exam  Vitals and nursing note reviewed.   Constitutional:       Appearance: She is well-developed.   HENT:      Head: Normocephalic and atraumatic. No abrasion, contusion or laceration.      Right Ear: External ear normal.      Left Ear: External ear normal.      Nose: Nose normal.   Eyes:      General: Lids are normal.      Conjunctiva/sclera: Conjunctivae normal.      Pupils: Pupils are equal, round, and reactive to light.   Neck:      Trachea: Trachea and phonation normal.   Cardiovascular:      Rate and Rhythm: Normal rate and regular rhythm.      Heart sounds: Normal heart sounds.   Pulmonary:      Effort: Pulmonary effort is normal. No respiratory distress.      Breath sounds: Normal breath sounds. No stridor.   Musculoskeletal:         General: Normal range of motion.      Cervical back: Full passive range of motion without pain and neck supple.      Comments: EXAMINATION OF THE RIGHT KNEE SHOWS MILD INFRAPATELLAR SWELLING AND TENDERNESS WITHOUT ANY LAXITY OF THE JOINT AND DISTALLY NEUROVASCULARLY INTACT.  NO BRUISING.  NEGATIVE ANTERIOR-POSTERIOR DRAWER SIGN NEGATIVE KATHRINE'S.  CONSISTENT WITH RIGHT ANTERIOR KNEE CONTUSION    EXAMINATION OF THE RIGHT HAND AND 5TH DIGIT SHOWS MILD STIFFNESS HOWEVER FULLY ABLE TO  AND FLEX AND EXTEND THE RIGHT 5TH DIGIT WHICH WAS AFFECTED DIGIT.  NO ECCHYMOSIS.  CONSISTENT WITH FINGER SPRAIN   Skin:     General: Skin is warm and dry.      Capillary Refill: Capillary refill takes less than 2 seconds.      Findings: No abrasion, bruising, burn, ecchymosis, erythema, laceration, lesion or rash.   Neurological:      Mental Status: She is alert and oriented to person, place, and time.   Psychiatric:         Speech: Speech normal.         Behavior: Behavior normal.         Thought Content: Thought content normal.         Judgment: Judgment normal.       X-Ray Hand 3 View Right    Result  Date: 11/24/2022  EXAMINATION: XR HAND COMPLETE 3 VIEW RIGHT CLINICAL HISTORY: Unspecified injury of right wrist, hand and finger(s), initial encounter TECHNIQUE: PA, lateral, and oblique views of the right hand were performed. COMPARISON: None FINDINGS: Bones are well mineralized.  Slight ulnar minus variance.  No displaced fracture, dislocation or destructive osseous process. Joint spaces appear relatively maintained.  No subcutaneous emphysema or radiodense retained foreign body.     No acute displaced fracture-dislocation identified. Electronically signed by: Jefe Munguia MD Date:    11/24/2022 Time:    12:59    XR KNEE 3 VIEW RIGHT    Result Date: 11/24/2022  EXAMINATION: XR KNEE 3 VIEW RIGHT CLINICAL HISTORY: Unspecified injury of right wrist, hand and finger(s), initial encounter TECHNIQUE: AP, lateral, and Merchant views of the right knee were performed. COMPARISON: None FINDINGS: Bones are well mineralized.  There is slight lateral tilt of the bilateral patella.  No displaced fracture, dislocation or destructive osseous process.  Minimal tricompartmental degenerative change.  No large suprapatellar joint effusion.  No subcutaneous emphysema or radiodense retained foreign body.     No acute displaced fracture-dislocation identified. Electronically signed by: Jefe Munguia MD Date:    11/24/2022 Time:    13:00       Assessment:       1. Contusion of right knee, initial encounter    2. Other sprain of right little finger, initial encounter        Plan:         PATIENT REPORTS SIGNIFICANT IMPROVEMENT OF THE RIGHT KNEE PAIN AS WELL AS THE RIGHT 5TH DIGIT.  SHE STILL HAS SOME MILD RIGHT INFRAPATELLAR SWELLING AND PAIN BUT RANGE OF MOTION IS INTACT AND DISTALLY NEUROVASCULARLY INTACT.  SPLINT REMOVED AND SHE DOES HAVE SOME STIFFNESS OF THE RIGHT 5TH DIGIT WHICH WAS HYPEREXTENDED CONSISTENT WITH FINGER SPRAIN.  SHE IS BEEN TAKING IBUPROFEN WITH SUCCESS AND WILL ALLOW TO WORK WITH RESTRICTIONS OF NO HEAVY LIFTING  WITH RIGHT UPPER EXTREMITY AND NO SQUATTING OR KNEELING OR CLIMBING.  ANTICIPATE DISCHARGE TO REGULAR DUTY AT NEXT VISIT AND LIFTING OF ALL RESTRICTIONS.  I HAVE REVIEWED THE X-RAYS WHICH WERE NEGATIVE.     Patient Instructions: Attention not to aggravate affected area, Daily home exercises/warm soaks   Restrictions: Avoid climbing/kneeling/squatting, Limited use of right hand and arm  Follow up in about 1 week (around 12/5/2022).

## 2022-11-29 ENCOUNTER — TELEPHONE (OUTPATIENT)
Dept: URGENT CARE | Facility: CLINIC | Age: 47
End: 2022-11-29
Payer: COMMERCIAL

## 2022-12-05 ENCOUNTER — OFFICE VISIT (OUTPATIENT)
Dept: URGENT CARE | Facility: CLINIC | Age: 47
End: 2022-12-05
Payer: OTHER MISCELLANEOUS

## 2022-12-05 VITALS
HEART RATE: 75 BPM | OXYGEN SATURATION: 100 % | TEMPERATURE: 98 F | HEIGHT: 66 IN | SYSTOLIC BLOOD PRESSURE: 138 MMHG | WEIGHT: 225 LBS | RESPIRATION RATE: 18 BRPM | DIASTOLIC BLOOD PRESSURE: 91 MMHG | BODY MASS INDEX: 36.16 KG/M2

## 2022-12-05 DIAGNOSIS — Z02.6 ENCOUNTER RELATED TO WORKER'S COMPENSATION CLAIM: ICD-10-CM

## 2022-12-05 DIAGNOSIS — S63.696A OTHER SPRAIN OF RIGHT LITTLE FINGER, INITIAL ENCOUNTER: Primary | ICD-10-CM

## 2022-12-05 PROCEDURE — 99214 PR OFFICE/OUTPT VISIT, EST, LEVL IV, 30-39 MIN: ICD-10-PCS | Mod: S$GLB,,, | Performed by: STUDENT IN AN ORGANIZED HEALTH CARE EDUCATION/TRAINING PROGRAM

## 2022-12-05 PROCEDURE — 99214 OFFICE O/P EST MOD 30 MIN: CPT | Mod: S$GLB,,, | Performed by: STUDENT IN AN ORGANIZED HEALTH CARE EDUCATION/TRAINING PROGRAM

## 2022-12-05 NOTE — LETTER
[facility] Occupational Health  [address] [address]  Phone: [phone]  Fax: [phone]  [facility]: [id number]    Pt Name: Elisa Gongora  Injury Date: 11/24/2022   Employee ID: [id number] Date of Treatment: 12/05/2022   Company: [employer]      Appointment Time: 10:30 AM Arrived: 10:25 AM    Provider: Eun Gerber MD Time Out: 11:20 AM [facility] [place]     Office Treatment:   1. Other sprain of right little finger, initial encounter    2. Encounter related to worker's compensation claim          Patient Instructions: Attention not to aggravate affected area, Use splint as directed, Daily home exercises/warm soaks      Restrictions: Limited use of right hand and arm, No lifting/pushing/pulling more than 10 lbs     Return Appointment: 12/19/2022 at 10:30 AM [facility] [place]

## 2022-12-05 NOTE — PROGRESS NOTES
Subjective:       Patient ID: Elisa Gongora is a 47 y.o. female.    Chief Complaint: Hand Injury (right) and Knee Injury (right)    RV injury patient (11/24/22) DOI-RIGHT HAND RIGHT KNEE  [employer]  - . Patient is here to follow up on right knee and right hand. RHD. Pain is gone in her knee and 2/10 pain in the hand. Patient is taking Tylenol for pain. MCJ    Patient states that her right knee pain has completely resolved.  Her right hand 5th digit injury is much improved but she still has pain and unable to perform her job duties.  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  She attempted to go to work last week but left early.  States that there are not other duties in the kitchen she can perform that do not require use of her right hand.    Hand Injury   Associated symptoms include numbness. Pertinent negatives include no chest pain.   Knee Injury  This is a new problem. The current episode started today. The problem occurs constantly. The problem has been unchanged. Associated symptoms include arthralgias, joint swelling, myalgias and numbness. Pertinent negatives include no abdominal pain, chest pain, chills, congestion, coughing, diaphoresis, fatigue, fever, headaches, neck pain or rash. The symptoms are aggravated by standing, walking and bending. She has tried NSAIDs (aleve) for the symptoms.     Constitution: Negative for chills, sweating, fatigue and fever.   HENT:  Negative for congestion.    Neck: Negative for neck pain.   Cardiovascular:  Negative for chest pain.   Respiratory:  Negative for cough.    Gastrointestinal:  Negative for abdominal pain.   Musculoskeletal:  Positive for joint pain, joint swelling and muscle ache.   Skin:  Negative for rash.   Neurological:  Positive for numbness. Negative for headaches.      Objective:      Physical Exam  Vitals and  nursing note reviewed.   Constitutional:       General: She is not in acute distress.     Appearance: She is not ill-appearing.   HENT:      Head: Normocephalic.   Eyes:      Conjunctiva/sclera: Conjunctivae normal.   Pulmonary:      Effort: No respiratory distress.   Musculoskeletal:      Right hand: No swelling or deformity. Normal sensation. Normal capillary refill. Normal pulse.      Right knee: Normal.      Comments: Right hand 5th digit tenderness of DIP joint. Normal ROM and strength of digit. No erythema, warmth, bruising or swelling   Skin:     General: Skin is warm and dry.   Neurological:      Mental Status: She is alert and oriented to person, place, and time.      Coordination: Coordination normal.      Gait: Gait normal.   Psychiatric:         Attention and Perception: Attention normal.         Mood and Affect: Mood normal.         Behavior: Behavior normal.         Thought Content: Thought content normal.       Assessment:       1. Encounter related to worker's compensation claim    2. Other sprain of right little finger, initial encounter          Plan:         Patient states that she does not feel that she can fully perform her job duties yet due to increased pain when using the right have 5th digit.  We discussed resuming use of the finger splint to avoid bending at the DIP joint and protect the digit from trauma.  The patient was happy with this plan of care in a short finger splint was provided that reduced range of motion at the DIP only.  I informed her that she mostly needs to wear this at work or when using her right hand.  Otherwise, remove the splint for gentle range of motion and stretching of the digit.  Will re-evaluate at follow-up visit with plans to discharge if improvement is noted.       Patient Instructions: Attention not to aggravate affected area, Use splint as directed, Daily home exercises/warm soaks   Restrictions: Limited use of right hand and arm, No lifting/pushing/pulling  more than 10 lbs  Follow up in about 2 weeks (around 12/19/2022).

## 2022-12-19 ENCOUNTER — OFFICE VISIT (OUTPATIENT)
Dept: URGENT CARE | Facility: CLINIC | Age: 47
End: 2022-12-19
Payer: OTHER MISCELLANEOUS

## 2022-12-19 VITALS
HEIGHT: 66 IN | BODY MASS INDEX: 36.16 KG/M2 | RESPIRATION RATE: 20 BRPM | TEMPERATURE: 99 F | DIASTOLIC BLOOD PRESSURE: 82 MMHG | HEART RATE: 111 BPM | OXYGEN SATURATION: 99 % | WEIGHT: 225 LBS | SYSTOLIC BLOOD PRESSURE: 120 MMHG

## 2022-12-19 DIAGNOSIS — Z02.6 ENCOUNTER RELATED TO WORKER'S COMPENSATION CLAIM: Primary | ICD-10-CM

## 2022-12-19 DIAGNOSIS — S63.696D: ICD-10-CM

## 2022-12-19 PROCEDURE — 99214 PR OFFICE/OUTPT VISIT, EST, LEVL IV, 30-39 MIN: ICD-10-PCS | Mod: S$GLB,,,

## 2022-12-19 PROCEDURE — 99214 OFFICE O/P EST MOD 30 MIN: CPT | Mod: S$GLB,,,

## 2022-12-19 NOTE — LETTER
[facility] Occupational Health  [address] [address]  Phone: [phone]  Fax: [phone]  [facility]: [id number]    Pt Name: Elisa Gongora  Injury Date: 11/24/2022   Employee ID: [id number] Date of Treatment: 12/19/2022   Company: [employer]      Appointment Time: 10:15 AM Arrived: 10:32 AM   Provider: Keith Hitchcock, DO Time Out: 12:55 PM     Office Treatment:   1. Encounter related to worker's compensation claim    2. Other sprain of right little finger, subsequent encounter               Restrictions: Regular Duty, Discharged from Occupational Health     Return As Needed

## 2022-12-19 NOTE — PROGRESS NOTES
Subjective:       Patient ID: Elisa Gongora is a 47 y.o. female.    Chief Complaint: Hand Injury (Right) and Knee Injury (Right)    See MA note. No current pain to right 5th finger; Denies any right knee symptoms for some time now.    RV Right Hand/Knee Injury (DOI 11/24/22). She works for [employer] as a . She is here to follow up on right hand/knee injury. She states that she is still doing good. She only has a pain level of 2/10 pain for her hand. She is no longer taking Tylenol for pain. She states that she has gone back to work and has been doing well.    EC    Hand Injury   Pertinent negatives include no numbness.   Knee Injury  Pertinent negatives include no arthralgias, joint swelling, myalgias or numbness.     Constitution: Negative. Negative for generalized weakness.   HENT: Negative.     Neck: neck negative.   Cardiovascular: Negative.    Eyes: Negative.    Respiratory: Negative.     Gastrointestinal: Negative.    Endocrine: negative.   Genitourinary: Negative.    Musculoskeletal:  Positive for pain. Negative for joint pain, joint swelling, abnormal ROM of joint, muscle cramps and muscle ache.   Skin: Negative.    Neurological:  Negative for loss of balance, numbness and tingling.   Psychiatric/Behavioral:  Negative for sleep disturbance.       Objective:      Physical Exam  Vitals and nursing note reviewed.   Constitutional:       General: She is not in acute distress.  HENT:      Head: Normocephalic.   Eyes:      Conjunctiva/sclera: Conjunctivae normal.   Musculoskeletal:      Right wrist: No swelling or deformity. Normal range of motion. Normal pulse.      Right hand: No swelling, deformity, lacerations, tenderness or bony tenderness. Normal range of motion. Normal strength. Normal sensation. Normal pulse.      Cervical back: Normal range of motion.   Skin:     General: Skin is warm.      Capillary Refill: Capillary refill takes less than 2 seconds.   Neurological:      General:  No focal deficit present.      Mental Status: She is alert and oriented to person, place, and time.      Gait: Gait is intact.   Psychiatric:         Attention and Perception: Attention normal.         Mood and Affect: Mood and affect normal.         Speech: Speech normal.         Behavior: Behavior normal. Behavior is cooperative.       Assessment:       1. Encounter related to worker's compensation claim    2. Other sprain of right little finger, subsequent encounter          Plan:                Restrictions: Regular Duty, Discharged from Occupational Health  Follow up if symptoms worsen or fail to improve.

## (undated) DEVICE — KIT WING PAD POSITIONING

## (undated) DEVICE — SOL STRL WATER INJ 1000ML BG

## (undated) DEVICE — NDL HYPO REG 25G X 1 1/2

## (undated) DEVICE — JELLY SURGILUBE 5GR

## (undated) DEVICE — SYR 10CC LUER LOCK

## (undated) DEVICE — KITTNER ENDOSCOPIC BLNT 5MM

## (undated) DEVICE — SCISSOR 5MMX35CM DIRECT DRIVE

## (undated) DEVICE — PAD ABD 8X10 STERILE

## (undated) DEVICE — TROCAR KII FIOS 5MM X 100MM

## (undated) DEVICE — SEE MEDLINE ITEM 146296

## (undated) DEVICE — TROCAR KII FIOS 11MM X 100MM

## (undated) DEVICE — PAD PERINEAL SUPINE

## (undated) DEVICE — SUT MCRYL PLUS 4-0 PS2 27IN

## (undated) DEVICE — TROCAR ENDOPATH XCEL 11MM 10CM

## (undated) DEVICE — SEE MEDLINE ITEM 156930

## (undated) DEVICE — SEE MEDLINE ITEM 156925

## (undated) DEVICE — ADHESIVE DERMABOND ADVANCED

## (undated) DEVICE — ENDOSTITCH POLYSORB 0 ES-9

## (undated) DEVICE — SET TRI-LUMEN FILTERED TUBE

## (undated) DEVICE — SPONGE LAP 18X18 PREWASHED

## (undated) DEVICE — APPLIER CLIP EPIX UNIV 5X34

## (undated) DEVICE — ELECTRODE REM PLYHSV RETURN 9

## (undated) DEVICE — IRRIGATOR ENDOSCOPY DISP.

## (undated) DEVICE — SUT VICRYL 0 27 CT-2

## (undated) DEVICE — NDL INSUF ULTRA VERESS 120MM

## (undated) DEVICE — DRESSING LEUKOPLAST FLEX 1X3IN

## (undated) DEVICE — SEALER LIGASURE LAP 37CM 5MM

## (undated) DEVICE — SOL NS 1000CC

## (undated) DEVICE — TIP RUMI KOH-EFFICIENT 3.5

## (undated) DEVICE — SUT 0 VICRYL PLUS CT-1 27IN

## (undated) DEVICE — SOL CLEARIFY VISUALIZATION LAP

## (undated) DEVICE — CLOSURE SKIN STERI STRIP 1/2X4

## (undated) DEVICE — SET CYSTO IRRIGATION UNIV SPIK

## (undated) DEVICE — DRAIN WND 15FRX3/16X4.7MM TRCR

## (undated) DEVICE — PACK LAPAROSCOPY BAPTIST

## (undated) DEVICE — UNDERGLOVE BIOGEL PI SZ 6.5 LF

## (undated) DEVICE — SYR 50CC LL

## (undated) DEVICE — SOL 9P NACL IRR PIC IL

## (undated) DEVICE — MANIPULATOR TIP RUMI ORANGE

## (undated) DEVICE — GLOVE BIOGEL SKINSENSE PI 6.0

## (undated) DEVICE — APPLICATOR HEMOBLAST LAPSCP

## (undated) DEVICE — ENDOSTITCH INSTRUMENT

## (undated) DEVICE — BELLOW CANN HEMOBLAST 1.65GR

## (undated) DEVICE — SEE MEDLINE ITEM 152622

## (undated) DEVICE — PENCIL ELECTROSURG HOLST W/BLD

## (undated) DEVICE — DEVICE KOH EFCNT RUMI 3.0CM

## (undated) DEVICE — SEE MEDLINE ITEM 157110